# Patient Record
Sex: MALE | Race: WHITE
[De-identification: names, ages, dates, MRNs, and addresses within clinical notes are randomized per-mention and may not be internally consistent; named-entity substitution may affect disease eponyms.]

---

## 2017-08-22 NOTE — CT
CT abdomen and pelvis

 

Technique: Multiple axial sections were obtained from above the dome 

of the diaphragm inferiorly through the pubic symphysis.  Intravenous 

contrast was refused by the patient.  Oral contrast has been given.

 

Findings: Right inguinal hernia is identified containing a portion of 

the sigmoid colon.  This hernia does not appear to cause any 

obstruction at this time.

 

There appears to be bowel wall thickening within the right side of the

 colon and cecum.  No small bowel dilatation is seen.

 

Visualized lung bases shows nothing acute.  Noncontrast appearance of 

the liver appears within normal limits.  Small hiatal hernia is seen. 

 Spleen appears within normal limits.  Kidneys show no abnormal 

calcifications or hydronephrosis.  No discrete adrenal mass is 

appreciated.  Pancreas shows no discrete abnormality.  Surgical clips 

are seen around the stomach.  Appendix not appreciated with certainty.

  No free fluid or definite inflammatory change is seen.

 

Bone window settings were reviewed which show scattered degenerative 

change within the spine.  Unilateral spondylolytic defect is seen at 

L5-S1.

 

Impression:

1.  Right inguinal hernia containing a loop of nondilated sigmoid 

colon.

2.  Bowel wall thickening appears to be present within the right colon

 and cecum compatible with a nonspecific colitis.

3.  Appendix is not definitely visualized.

4.  Small hiatal hernia and other incidental findings.

 

Diagnostic code #3

## 2017-08-22 NOTE — EDM.PDOC
ED HPI GENERAL MEDICAL PROBLEM





- General


Chief Complaint: Abdominal Pain


Stated Complaint: LOWER ABDOMINAL PAIN


Time Seen by Provider: 08/22/17 14:49


Source of Information: Reports: Patient


History Limitations: Reports: No Limitations





- History of Present Illness


INITIAL COMMENTS - FREE TEXT/NARRATIVE: 





The patient presents with lower abdominal pain.  He has a known right inguinal 

hernia that has not bothered him for years.  He was walking across his lawn 

today and developed severe pain to the suprapubic/pubic region.  There was a 

mass there according to his wife.  The pain was better when he got here.  He 

had some nausea with it but no vomiting.  He got diaphoretic.  He has no 

dysuria or hematuria.  He has no fever, chills, cough, chest pain or shortness 

of breath.


Onset: Sudden


Duration: Minutes:


Location: Reports: Abdomen (Suprapubic and pubic)


Quality: Reports: Sharp


Severity: Severe


Improves with: Reports: None


Worsens with: Reports: None


Associated Symptoms: Reports: Nausea/Vomiting.  Denies: Chest Pain, Cough, Fever

/Chills, Shortness of Breath


  ** Lower Abdominal


Pain Score (Numeric/FACES): 8





- Related Data


 Allergies











Allergy/AdvReac Type Severity Reaction Status Date / Time


 


ACE Inhibitors Allergy  Cough Verified 08/22/17 14:50











Home Meds: 


 Home Meds





Acetaminophen/Diphenhydramine [Tylenol Pm Ex-Strength Caplet] 1 tab PO BEDTIME 

PRN 09/09/16 [History]


Aspirin 162 mg PO DAILY 09/09/16 [History]


Fluticasone/Salmeterol [Advair 250-50 Diskus] 1 puff INH BID 09/09/16 [History]


Insulin Detemir [Levemir Flextouch] 6 units SQ BEDTIME 09/09/16 [History]


Insulin Detemir [Levemir Flextouch] 26 units SQ QAM 09/09/16 [History]


Metoprolol Tartrate 25 mg PO BID 09/09/16 [History]


Multivitamin [Multivitamins] 1 each PO DAILY 09/09/16 [History]


Olmesartan [Benicar] 5 mg PO DAILY 09/09/16 [History]


Silver Sulfadiazine [Silvadene 1% Cream 20 GM] 1 applic TOP TID PRN 09/09/16 [

History]


Triamcinolone Acetonide [Kenalog-10] 1 applic TOP BID PRN 09/09/16 [History]


Ubidecarenone [Coq-10] 1 tab PO DAILY 09/09/16 [History]


atorvaSTATin [Lipitor] 40 mg PO BEDTIME 09/09/16 [History]


Levothyroxine 150 mcg PO DAILY 08/22/17 [History]


Melatonin 10 mg PO BEDTIME 08/22/17 [History]











Past Medical History


HEENT History: Reports: Allergic Rhinitis, Impaired Vision, Other (See Below)


Other HEENT History: wears glasses


Cardiovascular History: Reports: CAD, High Cholesterol, Hypertension, MI


Respiratory History: Reports: Sleep Apnea


Other Respiratory History: medialstinal and hilar lympadenopathy, restrictive 

lung disease


Gastrointestinal History: Reports: Other (See Below)


Other Gastrointestinal History: Hernia


Genitourinary History: Reports: Other (See Below)


Other Genitourinary History: erectile dysfunction


Musculoskeletal History: Reports: Fracture


Other Musculoskeletal History: broken wrist


Neurological History: Reports: CVA, Other (See Below)


Other Neuro History: Right sided weakness


Endocrine/Metabolic History: Reports: Diabetes, Type II, Hyperthyroidism, 

Hypothyroidism


Other Endocrine/Metabolic History: thyroid mass


Hematologic History: Reports: Other (See Below)


Other Hematologic History: thrombocytopenia, hyperkalemia


Dermatologic History: Reports: Seborrheic Dermatitis





- Past Surgical History


Cardiovascular Surgical History: Reports: Coronary Artery Bypass


Endocrine Surgical History: Reports: Thyroidectomy





Social & Family History





- Family History


Family Medical History: Noncontributory





- Tobacco Use


Smoking Status *Q: Never Smoker


Month Tobacco Last Used: 30 years ago





- Recreational Drug Use


Recreational Drug Use: No


Drug Use in Last 12 Months: No





ED ROS GENERAL





- Review of Systems


Review Of Systems: See Below


Constitutional: Reports: No Symptoms


HEENT: Reports: No Symptoms


Respiratory: Reports: No Symptoms


Cardiovascular: Reports: No Symptoms


Endocrine: Reports: No Symptoms


GI/Abdominal: Reports: Abdominal Pain, Nausea.  Denies: Vomiting


: Reports: No Symptoms


Musculoskeletal: Reports: No Symptoms





ED EXAM, GI/ABD





- Physical Exam


Exam: See Below


Exam Limited By: No Limitations


General Appearance: Alert, No Apparent Distress


Ears: Normal External Exam


Nose: Normal Inspection


Head: Atraumatic, Normocephalic


Neck: Normal Inspection


Respiratory/Chest: No Respiratory Distress, Lungs Clear, Normal Breath Sounds


Cardiovascular: Regular Rate, Rhythm, No Edema, No Murmur


GI/Abdominal Exam: Soft, Non-Tender, No Organomegaly, No Mass, Other (Fullness 

to the right inguinal area)





Course





- Vital Signs


Last Recorded V/S: 


 Last Vital Signs











Temp  97.2 F   08/22/17 14:46


 


Pulse  69   08/22/17 14:46


 


Resp  16   08/22/17 14:46


 


BP  142/80 H  08/22/17 14:46


 


Pulse Ox  98   08/22/17 14:46














- Orders/Labs/Meds


Orders: 


 Active Orders 24 hr











 Category Date Time Status


 


 Peripheral IV Care [RC] .AS DIRECTED Care  08/22/17 15:08 Active


 


 Sodium Chloride 0.9% [Normal Saline] 1,000 ml Med  08/22/17 15:15 Active





 IV ASDIRECTED   


 


 Sodium Chloride 0.9% [Saline Flush] Med  08/22/17 15:07 Active





 10 ml FLUSH ASDIRECTED PRN   


 


 Peripheral IV Insertion Adult [OM.PC] Stat Oth  08/22/17 15:07 Ordered








 Medication Orders





Sodium Chloride (Normal Saline)  1,000 mls @ 125 mls/hr IV ASDIRECTED ODALIS


   Last Admin: 08/22/17 15:27  Dose: 125 mls/hr


Sodium Chloride (Saline Flush)  10 ml FLUSH ASDIRECTED PRN


   PRN Reason: Keep Vein Open


   Last Admin: 08/22/17 15:26  Dose: 10 ml








Labs: 


 Laboratory Tests











  08/22/17 08/22/17 08/22/17 Range/Units





  15:20 15:25 15:25 


 


WBC   3.43 L   (4.23-9.07)  K/mm3


 


RBC   4.52 L   (4.63-6.08)  M/mm3


 


Hgb   14.6   (13.7-17.5)  gm/L


 


Hct   43.1   (40.1-51.0)  %


 


MCV   95.4 H   (79.0-92.2)  fl


 


MCH   32.3 H   (25.7-32.2)  pg


 


MCHC   33.9   (32.2-35.5)  g/dl


 


RDW Std Deviation   43.7   (35.1-43.9)  fL


 


Plt Count   112 L   (163-337)  K/mm3


 


MPV   11.0   (9.4-12.3)  fl


 


Neut % (Auto)   53.6   (34.0-67.9)  %


 


Lymph % (Auto)   28.3   (21.8-53.1)  %


 


Mono % (Auto)   10.8   (5.3-12.2)  %


 


Eos % (Auto)   6.4   (0.8-7.0)  


 


Baso % (Auto)   0.9   (0.1-1.2)  %


 


Neut # (Auto)   1.84   (1.78-5.38)  K/mm3


 


Lymph # (Auto)   0.97 L   (1.32-3.57)  K/mm3


 


Mono # (Auto)   0.37   (0.30-0.82)  K/mm3


 


Eos # (Auto)   0.22   (0.04-0.54)  K/mm3


 


Baso # (Auto)   0.03   (0.01-0.08)  K/mm3


 


Sodium    142  (136-145)  mEq/L


 


Potassium    5.1  (3.5-5.1)  mEq/L


 


Chloride    105  ()  mEq/L


 


Carbon Dioxide    29  (21-32)  mEq/L


 


Anion Gap    13.1  (5-15)  


 


BUN    26 H  (7-18)  mg/dL


 


Creatinine    1.3  (0.7-1.3)  mg/dL


 


Est Cr Clr Drug Dosing    55.45  mL/min


 


Estimated GFR (MDRD)    55  (>60)  mL/min


 


BUN/Creatinine Ratio    20.0 H  (14-18)  


 


Glucose    280 H  ()  mg/dL


 


Calcium    8.5  (8.5-10.1)  mg/dL


 


Total Bilirubin    0.8  (0.2-1.0)  mg/dL


 


AST    39 H  (15-37)  U/L


 


ALT    46  (16-63)  U/L


 


Alkaline Phosphatase    91  ()  U/L


 


Total Protein    6.6  (6.4-8.2)  g/dl


 


Albumin    3.5  (3.4-5.0)  g/dl


 


Globulin    3.1  gm/dL


 


Albumin/Globulin Ratio    1.1  (1-2)  


 


Lipase    111  ()  U/L


 


Urine Color  Light yellow    (Yellow)  


 


Urine Appearance  Clear    (Clear)  


 


Urine pH  6.5    (5.0-8.0)  


 


Ur Specific Gravity  1.015    (1.005-1.030)  


 


Urine Protein  Negative    (Negative)  


 


Urine Glucose (UA)  2+ H    (Negative)  


 


Urine Ketones  Negative    (Negative)  


 


Urine Occult Blood  Trace-intact H    (Negative)  


 


Urine Nitrite  Negative    (Negative)  


 


Urine Bilirubin  Negative    (Negative)  


 


Urine Urobilinogen  0.2    (0.2-1.0)  


 


Ur Leukocyte Esterase  Negative    (Negative)  


 


Urine RBC  5-10 H    (0-5)  /hpf


 


Urine WBC  0-5    (0-5)  /hpf


 


Ur Epithelial Cells  0-5    (0-5)  /hpf


 


Urine Bacteria  Rare    (FEW)  /hpf


 


Urine Mucus  Not seen    (FEW)  /hpf











Meds: 


Medications











Generic Name Dose Route Start Last Admin





  Trade Name Freq  PRN Reason Stop Dose Admin


 


Sodium Chloride  1,000 mls @ 125 mls/hr  08/22/17 15:15  08/22/17 15:27





  Normal Saline  IV   125 mls/hr





  ASDIRECTED ODALIS   Administration


 


Sodium Chloride  10 ml  08/22/17 15:07  08/22/17 15:26





  Saline Flush  FLUSH   10 ml





  ASDIRECTED PRN   Administration





  Keep Vein Open   














Discontinued Medications














Generic Name Dose Route Start Last Admin





  Trade Name Freq  PRN Reason Stop Dose Admin


 


Diatrizoate Meglum/Diatrizoate Sod  90 ml  08/22/17 15:55  08/22/17 16:26





  Gastrografin 37%  PO  08/22/17 15:56  90 ml





  ONETIME ONE   Administration


 


Hydromorphone HCl  0.5 mg  08/22/17 16:55  





  Dilaudid  IVPUSH  08/22/17 16:56  





  ONETIME ONE   


 


Lorazepam  0.5 mg  08/22/17 16:55  





  Ativan  IVPUSH  08/22/17 16:56  





  ONETIME ONE   














- Re-Assessments/Exams


Free Text/Narrative Re-Assessment/Exam: 





08/22/17 17:23


I ordered an IV saline lock, labs and a CT of his abdomen and pelvis.


08/22/17 17:26


His WBC was 3.43.  His BUN was elevated at 26.  His glucose was 280.  His AST 

was elevated at 39.  His UA shows no UTI.  His CT shows right inguinal hernia 

containing a loop of nondilated sigmoid colon.  Bowel wall thickening appears 

to be present within the right colon and cecum compatible with a nonspecific 

colitis.  Appendix is not definitely visualized.  Small hiatal hernia and other 

incidental findings.  When he go up to go to CT his pain come back but not as 

bad.  He went to have a bowel movement and it came back severe.  I ordered 

dilaudid 0.5mg IV and ativan 0.5mg IV.  He was able to relax and the pain got 

better.  The hernia was not bulging out when he had the severe pain.  I could 

not fully reduce it.


08/22/17 17:40


Dr Easley came to see the patient and he was able to fully reduce it.  He 

would like to see him Thursday and he would like a truss from [facility].





Departure





- Departure


Time of Disposition: 17:45


Disposition: Home, Self-Care 01


Condition: Good


Clinical Impression: 


 Right inguinal hernia








- Discharge Information


Referrals: 


Chris Easley MD [Physician] - 


Forms:  ED Department Discharge


Additional Instructions: 


Get the hernia truss from [facility] formally know as [facility].  Follow 

up with  Thursday.  Call tomorrow to make an appointment and make sure you 

tell them you were in the ER and Dr Easley wants to see you on Thursday.  

Please return if you are worse.





- My Orders


Last 24 Hours: 


My Active Orders





08/22/17 15:07


Sodium Chloride 0.9% [Saline Flush]   10 ml FLUSH ASDIRECTED PRN 


Peripheral IV Insertion Adult [OM.PC] Stat 





08/22/17 15:08


Peripheral IV Care [RC] .AS DIRECTED 





08/22/17 15:15


Sodium Chloride 0.9% [Normal Saline] 1,000 ml IV ASDIRECTED 














- Assessment/Plan


Last 24 Hours: 


My Active Orders





08/22/17 15:07


Sodium Chloride 0.9% [Saline Flush]   10 ml FLUSH ASDIRECTED PRN 


Peripheral IV Insertion Adult [OM.PC] Stat 





08/22/17 15:08


Peripheral IV Care [RC] .AS DIRECTED 





08/22/17 15:15


Sodium Chloride 0.9% [Normal Saline] 1,000 ml IV ASDIRECTED

## 2017-09-20 NOTE — PCM.POSTAN
POST ANESTHESIA ASSESSMENT





- MENTAL STATUS


Mental Status: Alert, Oriented





- VITAL SIGNS


Pulse Rate: 76


SaO2: 94


Resp Rate: 16


Blood Pressure: 106/64


Temperature: 37.6 C





- RESPIRATORY


Respiratory Status: Respiratory Rate WNL, Airway Patent, O2 Saturation Stable, 

Supplemental Oxygen





- CARDIOVASCULAR


CV Status: Pulse Rate WNL, Blood Pressure Stable





- GASTROINTESTINAL


GI Status: No Symptoms





- PAIN


Pain Score: 0





- POST OP HYDRATION


Hydration Status: Adequate & Stable

## 2017-09-20 NOTE — PCM.OPNOTE
- General Post-Op/Procedure Note


Date of Surgery/Procedure: 09/20/17


Operative Procedure(s): 1. Open right inguinal hernia repair with mesh.  2. 

Excision of cord lipoma


Findings: 





Subcutaneous tissue scarring as well as scarring along the external oblique 

fascia. There was a large indirect hernia sac located anterior and medial to 

the spermatic cord structures. The floor of the canal was in good shape. There 

was a large cord lipoma attached to the hernia sac in the lateral position.


Pre Op Diagnosis: Symptomatic right inguinal hernia


Post-Op Diagnosis: Indirect right inguinal hernia.  Cord lipoma


Anesthesia Technique: General LMA, Local


Primary Surgeon: Chris Easley


Pathology: 





None


EBL in mLs: 5


Complications: None


Condition: Good


Free Text/Narrative:: 





After adequate LMA general anesthesia was obtained the patient's right groin 

was prepped and draped sterilely for the procedure. Local analgesia was given 

over the inguinal canal. An incision was made with a knife and extended down to 

the external oblique fascia which was opened in the direction of its fibers. 

There was extensive scarring from the truss that he had been wearing and the 

chronicity hernia. The nerve was identified and retracted laterally. The cord 

was mobilized at the pubic tubercle. The floor of the canal was intact. There 

was a large hernia sac present along the anteromedial aspect of the spermatic 

cord. The sac was carefully dissected away from cord structures after 

mobilizing the cord and controlled with a Penrose drain. I opened the sac which 

was empty. I used a 2-0 silk suture to ligate the base of the sac which was 

amputated. The previously described cord lipoma was clamped at its base and 

ligated as well after excision. Mesh was placed in the floor of the canal 

securing it to the inguinal ligament and the internal oblique fascia with 

running 2-0 Prolene. A keyhole in the mesh was used for cord egress. The field 

was irrigated out with saline. Spot hemostasis was obtained with cautery. The 

external oblique fascia was closed over the cord with a running Vicryl. Danica'

s and the skin were closed with Vicryl as well. Additional local analgesia was 

given before skin closure. Staples were used to close the skin. Gauze and tape 

were used for the dressing. There were no procedural complications.

## 2017-09-20 NOTE — PCM.PREANE
Preanesthetic Assessment





- Anesthesia/Transfusion/Family Hx


Anesthesia History: Prior Anesthesia Without Reaction


Family History of Anesthesia Reaction: No


Transfusion History: No Prior Transfusion(s)


Intubation History: Unknown





- Review of Systems


General: No Symptoms


Pulmonary: No Symptoms (Quit smoking 30 years ago./COPD Restrictive in pattern/

Resolved TSERING with weight loss)


Cardiovascular: No Symptoms (HTN, CAD, MI tentatively in 2014, CABG in 1998 

times 5 vessels, Patient denies any chest pain currently and walks daily.)


Neurological: No Symptoms (History of CVA 2014), Weakness (Right sided weakness 

noted.), Gait Disturbance (due to stroke and right sided weakness)


Other: Reports: Diabetes (0630 UT=893), Thyroid Problems (Thyroidectomy in July 2017)





- Physical Assessment


NPO Status Date: 09/19/17


NPO Status Time: 18:00


Pulse: 58


O2 Sat by Pulse Oximetry: 98


Respiratory Rate: 16


Blood Pressure: 130/78


Temperature: 36.6 C


Height: 1.79 m


Weight: 94.801 kg


ASA Class: 3


Mental Status: Alert & Oriented x3


Airway Class: Mallampati = 2


Dentition: Reports: Normal Dentition, Caries


Thyro-Mental Finger Breadths: 3


Mouth Opening Finger Breadths: 3


ROM/Head Extension: Full


Lungs: Clear to Auscultation, Normal Respiratory Effort, Decreased Breath Sounds


Cardiovascular: Regular Rate, Regular Rhythm, No Murmurs





- Lab


Values: 





Lab values reviewed and noted and within acceptable ranges to proceed with 

scheduled procedure.





Elevated BUN =26





MRSA -





- Imaging/EKG


Impressions: 





EKG:(July 13, 2017) SB with PVC's rate=59, RBBB, Inferior infarct- age 

undetermined





Stable mild hilar mediastinal lymphadenopathy noted.





EF: 55%





- Allergies


Allergies/Adverse Reactions: 


 Allergies











Allergy/AdvReac Type Severity Reaction Status Date / Time


 


ACE Inhibitors AdvReac  Cough Verified 09/20/17 07:54














- Anesthesia Plan


Pre-Op Medication Ordered: None


Beta Blocker: Metoprolol


Med Last Dose Date: 09/20/17


Med Last Dose Time: 06:30





- Acknowledgements


Anesthesia Type Planned: General Anesthesia


Pt an Appropriate Candidate for the Planned Anesthesia: Yes


Alternatives and Risks of Anesthesia Discussed w Pt/Guardian: Yes


Pt/Guardian Understands and Agrees with Anesthesia Plan: Yes





PreAnesthesia Questionnaire


HEENT History: Reports: Allergic Rhinitis, Impaired Vision, Other (See Below)


Other HEENT History: wears glasses


Cardiovascular History: Reports: CAD, High Cholesterol, Hypertension, MI


Respiratory History: Reports: Sleep Apnea


Other Respiratory History: medialstinal and hilar lympadenopathy, restrictive 

lung disease


Gastrointestinal History: Reports: Colon Polyp, Other (See Below)


Other Gastrointestinal History: Hernia


Genitourinary History: Reports: Other (See Below)


Other Genitourinary History: erectile dysfunction


OB/GYN History: Reports: None


Musculoskeletal History: Reports: Fracture


Other Musculoskeletal History: broken wrist


Neurological History: Reports: CVA, Other (See Below)


Other Neuro History: Right sided weakness


Psychiatric History: Reports: None


Endocrine/Metabolic History: Reports: Diabetes, Type II, Hyperthyroidism, 

Hypothyroidism


Other Endocrine/Metabolic History: thyroid mass


Hematologic History: Reports: Other (See Below)


Other Hematologic History: thrombocytopenia, hyperkalemia


Immunologic History: Reports: None


Oncologic (Cancer) History: Reports: None


Dermatologic History: Reports: Seborrheic Dermatitis





- Past Surgical History


Head Surgeries/Procedures: Reports: None


Cardiovascular Surgical History: Reports: Coronary Artery Bypass


GI Surgical History: Reports: Colonoscopy


Endocrine Surgical History: Reports: Thyroidectomy





- SUBSTANCE USE


Smoking Status *Q: Never Smoker


Recreational Drug Use History: No





- HOME MEDS


Home Medications: 


 Home Meds





Acetaminophen/Diphenhydramine [Tylenol Pm Ex-Strength Caplet] 1 tab PO BEDTIME 

PRN 09/09/16 [History]


Aspirin 162 mg PO DAILY 09/09/16 [History]


Fluticasone/Salmeterol [Advair 250-50 Diskus] 1 puff INH BID 09/09/16 [History]


Insulin Detemir [Levemir Flextouch] 6 units SQ BEDTIME 09/09/16 [History]


Insulin Detemir [Levemir Flextouch] 26 units SQ QAM 09/09/16 [History]


Metoprolol Tartrate 25 mg PO BID 09/09/16 [History]


Multivitamin [Multivitamins] 1 each PO DAILY 09/09/16 [History]


Olmesartan [Benicar] 5 mg PO DAILY 09/09/16 [History]


Silver Sulfadiazine [Silvadene 1% Cream 20 GM] 1 applic TOP TID PRN 09/09/16 [

History]


Triamcinolone Acetonide [Kenalog-10] 1 applic TOP BID PRN 09/09/16 [History]


Ubidecarenone [Coq-10] 1 tab PO DAILY 09/09/16 [History]


atorvaSTATin [Lipitor] 40 mg PO BEDTIME 09/09/16 [History]


Levothyroxine 150 mcg PO DAILY 08/22/17 [History]


Melatonin 10 mg PO BEDTIME 08/22/17 [History]











- CURRENT (IN HOUSE) MEDS


Current Meds: 





 Current Medications





Lactated Ringer's (Ringers, Lactated)  1,000 mls @ 125 mls/hr IV ASDIRECTED ODALIS


   Stop: 09/20/17 23:00


Lidocaine/Sodium Bicarbonate (Buffered Lidocaine 1% In Ns 8.4%)  0.25 ml .XX 

ONETIME PRN


   PRN Reason: Prior to IV Start


   Stop: 09/20/17 18:00


Sodium Chloride (Saline Flush)  10 ml FLUSH ASDIRECTED PRN


   PRN Reason: Keep Vein Open


   Stop: 09/20/17 18:00





Discontinued Medications





Dexamethasone (Dexamethasone) Confirm Administered Dose 4 mg .ROUTE .STK-MED ONE


   Stop: 09/20/17 07:12


Fentanyl (Sublimaze) Confirm Administered Dose 250 mcg .ROUTE .STK-MED ONE


   Stop: 09/20/17 07:11


Lidocaine HCl (Xylocaine-Mpf 1%) Confirm Administered Dose 4 mls @ as directed 

.ROUTE .STK-MED ONE


   Stop: 09/20/17 07:12


Midazolam HCl (Versed 1 Mg/Ml) Confirm Administered Dose 2 mg .ROUTE .STK-MED 

ONE


   Stop: 09/20/17 07:11


Ondansetron HCl (Zofran) Confirm Administered Dose 4 mg .ROUTE .STK-MED ONE


   Stop: 09/20/17 07:12


Propofol (Diprivan  20 Ml) Confirm Administered Dose 200 mg .ROUTE .STK-MED ONE


   Stop: 09/20/17 07:11

## 2017-11-13 NOTE — EDM.PDOC
ED HPI GENERAL MEDICAL PROBLEM





- General


Chief Complaint: Lower Extremity Injury/Pain


Stated Complaint: [facility]


Time Seen by Provider: 11/13/17 18:32


Source of Information: Reports: Patient


History Limitations: Reports: No Limitations





- History of Present Illness


INITIAL COMMENTS - FREE TEXT/NARRATIVE: 





Patient is a 72-year-old male with a history of stroke in 2014 with balance 

issues who presents to the ED complaining of right hip pain. Patient states he 

was putting something away in the kitchen and turned. Upon turning the patient 

lost his balance and fell on his right hip. There was no loss consciousness. He 

only complains of right hip pain denies any additional injuries remaining parts 

of his body.   Nor does he complain of any chest pain, shortness of breath, 

nausea vomiting, abdominal pain, numbness or tingling, or any additional 

complaints. Last meal was approximately noon today. States he has not had 

anything to drink sine lunch as well.  Patient has a long history of chronic 

comorbidities. Please see list. In relation to the stroke they were unable to 

isolate where the emboli came from. They suspect patient may have had a heart 

attack while crossing the [place] throwing multiple clots to his brain. Patient 

has has had multiple strokes apparent on imaging.


  ** Right Hip


Pain Score (Numeric/FACES): 8





- Related Data


 Allergies











Allergy/AdvReac Type Severity Reaction Status Date / Time


 


ACE Inhibitors AdvReac  Cough Verified 09/20/17 07:54











Home Meds: 


 Home Meds





Acetaminophen/Diphenhydramine [Tylenol Pm Ex-Strength Caplet] 1 tab PO BEDTIME 

PRN 09/09/16 [History]


Aspirin 162 mg PO DAILY 09/09/16 [History]


Fluticasone/Salmeterol [Advair 250-50 Diskus] 1 puff INH BID 09/09/16 [History]


Insulin Detemir [Levemir Flextouch] 6 units SQ BEDTIME 09/09/16 [History]


Insulin Detemir [Levemir Flextouch] 26 units SQ QAM 09/09/16 [History]


Metoprolol Tartrate 25 mg PO BID 09/09/16 [History]


Multivitamin [Multivitamins] 1 each PO DAILY 09/09/16 [History]


Olmesartan [Benicar] 5 mg PO DAILY 09/09/16 [History]


Silver Sulfadiazine [Silvadene 1% Cream 20 GM] 1 applic TOP TID PRN 09/09/16 [

History]


Triamcinolone Acetonide [Kenalog-10] 1 applic TOP BID PRN 09/09/16 [History]


Ubidecarenone [Coq-10] 1 tab PO DAILY 09/09/16 [History]


atorvaSTATin [Lipitor] 40 mg PO BEDTIME 09/09/16 [History]


Levothyroxine 150 mcg PO DAILY 08/22/17 [History]


Melatonin 10 mg PO BEDTIME 08/22/17 [History]











Past Medical History


HEENT History: Reports: Allergic Rhinitis, Impaired Vision, Other (See Below)


Other HEENT History: wears glasses


Cardiovascular History: Reports: CAD, High Cholesterol, Hypertension, MI


Respiratory History: Reports: Sleep Apnea


Other Respiratory History: medialstinal and hilar lympadenopathy, restrictive 

lung disease


Gastrointestinal History: Reports: Colon Polyp, Other (See Below)


Other Gastrointestinal History: Hernia


Genitourinary History: Reports: Other (See Below)


Other Genitourinary History: erectile dysfunction


OB/GYN History: Reports: None


Musculoskeletal History: Reports: Fracture


Other Musculoskeletal History: broken wrist


Neurological History: Reports: CVA, Other (See Below)


Other Neuro History: Right sided weakness


Psychiatric History: Reports: None


Endocrine/Metabolic History: Reports: Diabetes, Type II, Hyperthyroidism, 

Hypothyroidism


Other Endocrine/Metabolic History: thyroid mass


Hematologic History: Reports: Other (See Below)


Other Hematologic History: thrombocytopenia, hyperkalemia


Immunologic History: Reports: None


Oncologic (Cancer) History: Reports: None


Dermatologic History: Reports: Seborrheic Dermatitis





- Past Surgical History


Head Surgeries/Procedures: Reports: None


Cardiovascular Surgical History: Reports: Coronary Artery Bypass


GI Surgical History: Reports: Colonoscopy


Endocrine Surgical History: Reports: Thyroidectomy





Social & Family History





- Family History


Family Medical History: Noncontributory





- Tobacco Use


Smoking Status *Q: Never Smoker


Month Tobacco Last Used: 30 years ago


Second Hand Smoke Exposure: No





- Caffeine Use


Caffeine Use: Reports: Coffee





- Recreational Drug Use


Recreational Drug Use: No


Drug Use in Last 12 Months: No





Review of Systems





- Review of Systems


Review Of Systems: ROS reveals no pertinent complaints other than HPI.





ED EXAM, GENERAL





- Physical Exam


Exam: See Below


Exam Limited By: No Limitations


General Appearance: Alert, WD/WN, No Apparent Distress


Ears: Hearing Grossly Normal


Nose: No Blood


Throat/Mouth: Normal Voice, No Airway Compromise


Neck: Normal Inspection, Supple


Respiratory/Chest: No Respiratory Distress, Lungs Clear, Normal Breath Sounds, 

No Accessory Muscle Use, Chest Non-Tender


Cardiovascular: Normal Peripheral Pulses, Regular Rate, Rhythm, No Murmur (not 

obvious)


GI/Abdominal: Normal Bowel Sounds, Soft, Non-Tender, No Organomegaly, No 

Distention


Back Exam: Normal Inspection


Extremities: Other (Pain noted to the right hip with palpation. Patient's leg 

is externally rotated and shortened. Trace edema noted. No sensory motor 

deficits distally. Pulses intact.)


Neurological: Alert, CN II-XII Intact, Normal Cognition, No Motor/Sensory 

Deficits


Psychiatric: Normal Affect, Normal Mood


Skin Exam: Warm, Dry, Intact, Normal Color





Course





- Vital Signs


Last Recorded V/S: 


 Last Vital Signs











Temp  99.3 F   11/13/17 20:26


 


Pulse  72   11/13/17 20:26


 


Resp  17   11/13/17 20:26


 


BP  114/65   11/13/17 20:26


 


Pulse Ox  99   11/13/17 20:26














- Orders/Labs/Meds


Orders: 


 Active Orders 24 hr











 Category Date Time Status


 


 Mitchell Catheter Insertion [Insert Urinary Catheter] [OM. Care  11/13/17 18:45 

Ordered





 PC] Stat   


 


 Urinary Catheter Assessment [RC] ASDIRECTED Care  11/13/17 18:34 Active


 


 Chest 1V Frontal [CR] Stat Exams  11/13/17 18:33 Taken


 


 Hip Min 2V or 3V w Pelvis Rt [CR] Stat Exams  11/13/17 17:43 Taken


 


 Sodium Chloride 0.9% [Normal Saline] 1,000 ml Med  11/13/17 18:45 Active





 IV ASDIRECTED   








 Medication Orders





Acetaminophen (Tylenol)  650 mg PO Q4H PRN


   PRN Reason: Pain (Mild 1-3)/fever


Hydrocodone Bitart/Acetaminophen (Norco 325-5 Mg)  1 tab PO Q4H PRN


   PRN Reason: Pain (moderate 4-6)


   Last Admin: 11/13/17 22:13  Dose: 1 tab


Albuterol/Ipratropium (Duoneb 3.0-0.5 Mg/3 Ml)  3 ml NEB Q4H PRN


   PRN Reason: Shortness Of Breath/wheezing


Aspirin (Halfprin)  162 mg PO DAILY ODALIS


Bisacodyl (Dulcolax)  5 mg PO DAILY PRN


   PRN Reason: Constipation


Docusate Sodium (Colace)  100 mg PO BID PRN


   PRN Reason: Constipation


Hydromorphone HCl (Dilaudid)  1 mg IVPUSH Q4H PRN


   PRN Reason: Pain (severe 7-10)


Sodium Chloride (Normal Saline)  1,000 mls @ 150 mls/hr IV ASDIRECTED [facility]


   Last Admin: 11/13/17 19:32  Dose: 150 mls/hr


Promethazine HCl 12.5 mg/ (Sodium Chloride)  50.5 mls @ 100 mls/hr IV Q6H PRN


   PRN Reason: Nausea/Vomiting


Insulin Detemir (Levemir)  6 unit SUBCUT BEDTIME [facility]


Insulin Detemir (Levemir)  26 unit SUBCUT QAM [facility]


Levothyroxine Sodium (Levothyroxine)  150 mcg PO DAILY@0600 [facility]


Lorazepam (Ativan)  0.5 mg IV Q6H PRN


   PRN Reason: Anxiety


Losartan Potassium (Cozaar)  25 mg PO DAILY [facility]


Metoprolol Tartrate (Lopressor)  25 mg PO BID [facility]


Multivitamins (Thera)  1 each PO DAILY [facility]


Non-Formulary Medication (Acetaminophen/Diphenhydramine)  1 tab PO BEDTIME PRN


   PRN Reason: Insomnia


Non-Formulary Medication (Fluticasone/Salmeterol)  1 puff INH BID [facility]


Non-Formulary Medication (Melatonin [Melatonin])  10 mg PO BEDTIME [facility]


Non-Formulary Medication (Triamcinolone Acetonide [Kenalog-10])  1 applic TOP 

BID PRN


   PRN Reason: skin complications


Non-Formulary Medication (Ubidecarenone)  1 tab PO DAILY [facility]


Ondansetron HCl (Zofran)  4 mg IV Q6H PRN


   PRN Reason: Nausea/Vomiting


Polyethylene Glycol (Miralax)  17 gm PO DAILY PRN


   PRN Reason: Constipation


Rosuvastatin Calcium (Crestor)  10 mg PO DAILY [facility]


Senna/Docusate Sodium (Senna Plus)  1 tab PO BID PRN


   PRN Reason: Constipation


Silver Sulfadiazine (Silvadene 1% Cream 50 Gm)  0 gm TOP TID PRN


   PRN Reason: SKIN COMPLICATIONS


Temazepam (Restoril)  15 mg PO BEDTIME PRN


   PRN Reason: Sleep








Labs: 


 Laboratory Tests











  11/13/17 11/13/17 11/13/17 Range/Units





  17:50 17:50 17:50 


 


WBC  5.41    (4.23-9.07)  K/mm3


 


RBC  4.47 L    (4.63-6.08)  M/mm3


 


Hgb  14.4    (13.7-17.5)  gm/L


 


Hct  42.2    (40.1-51.0)  %


 


MCV  94.4 H    (79.0-92.2)  fl


 


MCH  32.2    (25.7-32.2)  pg


 


MCHC  34.1    (32.2-35.5)  g/dl


 


RDW Std Deviation  42.6    (35.1-43.9)  fL


 


Plt Count  130 L    (163-337)  K/mm3


 


MPV  10.6    (9.4-12.3)  fl


 


Neut % (Auto)  49.2    (34.0-67.9)  %


 


Lymph % (Auto)  29.6    (21.8-53.1)  %


 


Mono % (Auto)  12.6 H    (5.3-12.2)  %


 


Eos % (Auto)  7.4 H    (0.8-7.0)  


 


Baso % (Auto)  0.6    (0.1-1.2)  %


 


Neut # (Auto)  2.67    (1.78-5.38)  K/mm3


 


Lymph # (Auto)  1.60    (1.32-3.57)  K/mm3


 


Mono # (Auto)  0.68    (0.30-0.82)  K/mm3


 


Eos # (Auto)  0.40    (0.04-0.54)  K/mm3


 


Baso # (Auto)  0.03    (0.01-0.08)  K/mm3


 


PT   10.9   (8.0-13.0)  SECONDS


 


INR   1.00   


 


APTT   27   (22-36)  SECONDS


 


Sodium    141  (136-145)  mEq/L


 


Potassium    4.1  (3.5-5.1)  mEq/L


 


Chloride    105  ()  mEq/L


 


Carbon Dioxide    26  (21-32)  mEq/L


 


Anion Gap    14.1  (5-15)  


 


BUN    22 H  (7-18)  mg/dL


 


Creatinine    1.2  (0.7-1.3)  mg/dL


 


Est Cr Clr Drug Dosing    61.01  mL/min


 


Estimated GFR (MDRD)    60  (>60)  mL/min


 


BUN/Creatinine Ratio    18.3 H  (14-18)  


 


Glucose    156 H  ()  mg/dL


 


Calcium    8.3 L  (8.5-10.1)  mg/dL


 


Total Bilirubin    0.6  (0.2-1.0)  mg/dL


 


AST    23  (15-37)  U/L


 


ALT    35  (16-63)  U/L


 


Alkaline Phosphatase    99  ()  U/L


 


Total Protein    6.4  (6.4-8.2)  g/dl


 


Albumin    3.4  (3.4-5.0)  g/dl


 


Globulin    3.0  gm/dL


 


Albumin/Globulin Ratio    1.1  (1-2)  


 


Blood Type     


 


Gel Antibody Screen     














  11/13/17 Range/Units





  17:50 


 


WBC   (4.23-9.07)  K/mm3


 


RBC   (4.63-6.08)  M/mm3


 


Hgb   (13.7-17.5)  gm/L


 


Hct   (40.1-51.0)  %


 


MCV   (79.0-92.2)  fl


 


MCH   (25.7-32.2)  pg


 


MCHC   (32.2-35.5)  g/dl


 


RDW Std Deviation   (35.1-43.9)  fL


 


Plt Count   (163-337)  K/mm3


 


MPV   (9.4-12.3)  fl


 


Neut % (Auto)   (34.0-67.9)  %


 


Lymph % (Auto)   (21.8-53.1)  %


 


Mono % (Auto)   (5.3-12.2)  %


 


Eos % (Auto)   (0.8-7.0)  


 


Baso % (Auto)   (0.1-1.2)  %


 


Neut # (Auto)   (1.78-5.38)  K/mm3


 


Lymph # (Auto)   (1.32-3.57)  K/mm3


 


Mono # (Auto)   (0.30-0.82)  K/mm3


 


Eos # (Auto)   (0.04-0.54)  K/mm3


 


Baso # (Auto)   (0.01-0.08)  K/mm3


 


PT   (8.0-13.0)  SECONDS


 


INR   


 


APTT   (22-36)  SECONDS


 


Sodium   (136-145)  mEq/L


 


Potassium   (3.5-5.1)  mEq/L


 


Chloride   ()  mEq/L


 


Carbon Dioxide   (21-32)  mEq/L


 


Anion Gap   (5-15)  


 


BUN   (7-18)  mg/dL


 


Creatinine   (0.7-1.3)  mg/dL


 


Est Cr Clr Drug Dosing   mL/min


 


Estimated GFR (MDRD)   (>60)  mL/min


 


BUN/Creatinine Ratio   (14-18)  


 


Glucose   ()  mg/dL


 


Calcium   (8.5-10.1)  mg/dL


 


Total Bilirubin   (0.2-1.0)  mg/dL


 


AST   (15-37)  U/L


 


ALT   (16-63)  U/L


 


Alkaline Phosphatase   ()  U/L


 


Total Protein   (6.4-8.2)  g/dl


 


Albumin   (3.4-5.0)  g/dl


 


Globulin   gm/dL


 


Albumin/Globulin Ratio   (1-2)  


 


Blood Type  O POSITIVE  


 


Gel Antibody Screen  Negative  











Meds: 


Medications











Generic Name Dose Route Start Last Admin





  Trade Name Freq  PRN Reason Stop Dose Admin


 


Acetaminophen  650 mg  11/13/17 21:30  





  Tylenol  PO   





  Q4H PRN   





  Pain (Mild 1-3)/fever   


 


Hydrocodone Bitart/Acetaminophen  1 tab  11/13/17 21:30  11/13/17 22:13





  Norco 325-5 Mg  PO   1 tab





  Q4H PRN   Administration





  Pain (moderate 4-6)   


 


Albuterol/Ipratropium  3 ml  11/13/17 21:30  





  Duoneb 3.0-0.5 Mg/3 Ml  NEB   





  Q4H PRN   





  Shortness Of Breath/wheezing   


 


Aspirin  162 mg  11/14/17 09:00  





  Halfprin  PO   





  DAILY ODALIS   


 


Bisacodyl  5 mg  11/13/17 21:30  





  Dulcolax  PO   





  DAILY PRN   





  Constipation   


 


Docusate Sodium  100 mg  11/13/17 21:30  





  Colace  PO   





  BID PRN   





  Constipation   


 


Hydromorphone HCl  1 mg  11/13/17 21:30  





  Dilaudid  IVPUSH   





  Q4H PRN   





  Pain (severe 7-10)   


 


Sodium Chloride  1,000 mls @ 150 mls/hr  11/13/17 18:45  11/13/17 19:32





  Normal Saline  IV   150 mls/hr





  ASDIRECTED ODALIS   Administration


 


Promethazine HCl 12.5 mg/  50.5 mls @ 100 mls/hr  11/13/17 21:30  





  Sodium Chloride  IV   





  Q6H PRN   





  Nausea/Vomiting   


 


Insulin Detemir  6 unit  11/14/17 21:00  





  Levemir  SUBCUT   





  BEDTIME ODALIS   


 


Insulin Detemir  26 unit  11/14/17 08:00  





  Levemir  SUBCUT   





  QAM [facility]   


 


Levothyroxine Sodium  150 mcg  11/14/17 06:00  





  Levothyroxine  PO   





  DAILY@0600 ODAL[facility]   


 


Lorazepam  0.5 mg  11/13/17 21:30  





  Ativan  IV   





  Q6H PRN   





  Anxiety   


 


Losartan Potassium  25 mg  11/14/17 09:00  





  Cozaar  PO   





  DAILY ODALIS   


 


Metoprolol Tartrate  25 mg  11/14/17 09:00  





  Lopressor  PO   





  BID ODALIS   


 


Multivitamins  1 each  11/14/17 09:00  





  Thera  PO   





  DAILY ODAL[facility]   


 


Non-Formulary Medication  1 tab  11/13/17 21:29  





  Acetaminophen/Diphenhydramine  PO   





  BEDTIME PRN   





  Insomnia   


 


Non-Formulary Medication  1 puff  11/14/17 09:00  





  Fluticasone/Salmeterol  INH   





  BID ODALIS   


 


Non-Formulary Medication  10 mg  11/14/17 21:00  





  Melatonin [Melatonin]  PO   





  BEDTIME ODAL[facility]   


 


Non-Formulary Medication  1 applic  11/13/17 21:29  





  Triamcinolone Acetonide [Kenalog-10]  TOP   





  BID PRN   





  skin complications   


 


Non-Formulary Medication  1 tab  11/14/17 09:00  





  Ubidecarenone  PO   





  DAILY ODALIS   


 


Ondansetron HCl  4 mg  11/13/17 21:30  





  Zofran  IV   





  Q6H PRN   





  Nausea/Vomiting   


 


Polyethylene Glycol  17 gm  11/13/17 21:30  





  Miralax  PO   





  DAILY PRN   





  Constipation   


 


Rosuvastatin Calcium  10 mg  11/14/17 09:00  





  Crestor  PO   





  DAILY ODALIS   


 


Senna/Docusate Sodium  1 tab  11/13/17 21:30  





  Senna Plus  PO   





  BID PRN   





  Constipation   


 


Silver Sulfadiazine  0 gm  11/13/17 22:15  





  Silvadene 1% Cream 50 Gm  TOP   





  TID PRN   





  SKIN COMPLICATIONS   


 


Temazepam  15 mg  11/13/17 21:30  





  Restoril  PO   





  BEDTIME PRN   





  Sleep   














Discontinued Medications














Generic Name Dose Route Start Last Admin





  Trade Name Freq  PRN Reason Stop Dose Admin


 


Hydromorphone HCl  0.5 mg  11/13/17 17:58  11/13/17 18:01





  Dilaudid  IVPUSH  11/13/17 17:59  0.5 mg





  NOW STA   Administration


 


Hydromorphone HCl  1 mg  11/13/17 18:31  11/13/17 18:36





  Dilaudid  IVPUSH  11/13/17 18:32  1 mg





  ONETIME ONE   Administration


 


Silver Sulfadiazine  0 gm  11/13/17 22:15  





  Silvadene 1% Cream 400 Gm  TOP   





  TID PRN   





  SKIN COMPLICATIONS   














- Re-Assessments/Exams


Free Text/Narrative Re-Assessment/Exam: 








X-ray of the right hip was obtained revealing a intertrochanteric fracture 

fracture. IV has been established. With Dilaudid 0.5 mg IVP. 





Patient is requesting additional pain medications. Ordered normal saline 150 

mls per hour with Dilaudid 1 mg IVP. Labs and studies ordered include: CBC, 

chem 14, UA, type and screen, EKG, chest x-ray ordered. In addition Mitchell has 

been ordered.





1928 Spoke with Dr. Berry Orthopedic Surgeon on-call. He requests admit to 

hospitalists to medically clear for surgery, plan on doing surgery Wednesday of 

this week.





MCG being completed. 





Labs reviewed: CBC and chem 14 were essentially normal.





Chest x-ray one view: Old sternotomy wires from previous coronary bypass. 

Otherwise no acute findings noted. Final interpretation is pending.





EKG sinus rhythm with a right bundle branch block at a rate of 76. No past EKG'

s present to compare with. 

















Departure





- Departure


Time of Disposition: 19:58


Disposition: Admitted As Inpatient 68


Clinical Impression: 


 Intertrochanteric fracture, hip








- Discharge Information





- My Orders


Last 24 Hours: 


My Active Orders





11/13/17 17:43


Hip Min 2V or 3V w Pelvis Rt [CR] Stat 





11/13/17 18:33


Chest 1V Frontal [CR] Stat 





11/13/17 18:34


Urinary Catheter Assessment [RC] ASDIRECTED 





11/13/17 18:45


Mitchell Catheter Insertion [Insert Urinary Catheter] [OM.PC] Stat 


Sodium Chloride 0.9% [Normal Saline] 1,000 ml IV ASDIRECTED 














- Assessment/Plan


Last 24 Hours: 


My Active Orders





11/13/17 17:43


Hip Min 2V or 3V w Pelvis Rt [CR] Stat 





11/13/17 18:33


Chest 1V Frontal [CR] Stat 





11/13/17 18:34


Urinary Catheter Assessment [RC] ASDIRECTED 





11/13/17 18:45


Mitchell Catheter Insertion [Insert Urinary Catheter] [OM.PC] Stat 


Sodium Chloride 0.9% [Normal Saline] 1,000 ml IV ASDIRECTED

## 2017-11-13 NOTE — PCM.HP
H&P History of Present Illness





- General


Date of Service: 11/13/17


Admit Problem/Dx: 


 Admission Diagnosis/Problem





Admission Diagnosis/Problem      Intertrochanteric fracture, hip








Source of Information: Patient, Family, Old Records, RN Notes Reviewed


History Limitations: Reports: Physical Impairment





- History of Present Illness


Initial Comments - Free Text/Narative: 


This is a 71 yo elderly white male with past medical hx/o Impaired Vision, AR, 

CAD S/p 5 Vessel CABG in 1998, HD, HTN, MI in 1997, TSERING not on CPAP, 

Restrictive Lung Disease, Hx/o Mediastinal and Hilar Lymphadenopathy, Hx/o 

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 

Hypothyroidism S/p Thyroidectomy, Chronic Thrombocytopenia, Hyperkalemia and 

Seborrheic Dermatitis who comes in for an evaluation of hip pain after 

sustaining a mechanical fall that took place in his kitchen at home. Patient 

reports no prodromal symptoms. He admits to having bad socks that caused him to 

lose his balance, twist and fall.





Patient carries a baseline right sided residual deficits form his hx/o Multiple 

CVA. His initial work up in ED, shows a CBC that is remarkable for RBC of 4.47, 

MCV of 94.4, platelet of 1:30, monocytes of 12.6%, and eosinophils of 7.4%. His 

PT is 10.9, INR of 1, APTT of 27. his chemistry is remarkable for BUN of 22, 

creatinine of 1.2, glucose of 156, and calcium of 8.3. Chest x-ray shows no 

acute abnormal findings but his hip x-ray shows right inter-trochanteric 

fracture. 





Patient is being admitted for acute right hip fracture. He is DNR/DNI.





  ** Right Hip


Pain Score (Numeric/FACES): 8





- Related Data


Allergies/Adverse Reactions: 


 Allergies











Allergy/AdvReac Type Severity Reaction Status Date / Time


 


ACE Inhibitors AdvReac  Cough Verified 11/13/17 23:07











Home Medications: 


 Home Meds





Acetaminophen/Diphenhydramine [Tylenol Pm Ex-Strength Caplet] 1 tab PO BEDTIME 

PRN 09/09/16 [History]


Aspirin 162 mg PO DAILY 09/09/16 [History]


Fluticasone/Salmeterol [Advair 250-50 Diskus] 1 puff INH BID 09/09/16 [History]


Insulin Detemir [Levemir Flextouch] 6 units SQ BEDTIME 09/09/16 [History]


Insulin Detemir [Levemir Flextouch] 26 units SQ QAM 09/09/16 [History]


Metoprolol Tartrate 25 mg PO BID 09/09/16 [History]


Multivitamin [Multivitamins] 1 each PO DAILY 09/09/16 [History]


Olmesartan [Benicar] 5 mg PO DAILY 09/09/16 [History]


Silver Sulfadiazine [Silvadene 1% Cream 20 GM] 1 applic TOP TID PRN 09/09/16 [

History]


Triamcinolone Acetonide [Kenalog-10] 1 applic TOP BID PRN 09/09/16 [History]


Ubidecarenone [Coq-10] 1 tab PO DAILY 09/09/16 [History]


atorvaSTATin [Lipitor] 40 mg PO BEDTIME 09/09/16 [History]


Levothyroxine 150 mcg PO DAILY 08/22/17 [History]


Melatonin 10 mg PO BEDTIME 08/22/17 [History]











Past Medical History


HEENT History: Reports: Allergic Rhinitis, Impaired Vision, Other (See Below)


Other HEENT History: wears glasses


Cardiovascular History: Reports: CAD, High Cholesterol, Hypertension, MI


Respiratory History: Reports: Sleep Apnea


Other Respiratory History: medialstinal and hilar lympadenopathy, restrictive 

lung disease


Gastrointestinal History: Reports: Colon Polyp, Other (See Below)


Other Gastrointestinal History: Hernia


Genitourinary History: Reports: Other (See Below)


Other Genitourinary History: erectile dysfunction


OB/GYN History: Reports: None


Musculoskeletal History: Reports: Fracture


Other Musculoskeletal History: broken wrist


Neurological History: Reports: CVA, Other (See Below)


Other Neuro History: Right sided weakness


Psychiatric History: Reports: None


Endocrine/Metabolic History: Reports: Diabetes, Type II, Hyperthyroidism, 

Hypothyroidism


Other Endocrine/Metabolic History: thyroid mass


Hematologic History: Reports: Other (See Below)


Other Hematologic History: thrombocytopenia, hyperkalemia


Immunologic History: Reports: None


Oncologic (Cancer) History: Reports: None


Dermatologic History: Reports: Seborrheic Dermatitis





- Past Surgical History


Head Surgeries/Procedures: Reports: None


Cardiovascular Surgical History: Reports: Coronary Artery Bypass


GI Surgical History: Reports: Colonoscopy


Endocrine Surgical History: Reports: Thyroidectomy





Social & Family History





- Family History


Family Medical History: Noncontributory





- Tobacco Use


Smoking Status *Q: Never Smoker


Month Tobacco Last Used: 30 years ago


Second Hand Smoke Exposure: No





- Caffeine Use


Caffeine Use: Reports: Coffee





- Recreational Drug Use


Recreational Drug Use: No


Drug Use in Last 12 Months: No





H&P Review of Systems





- Review of Systems:


Review Of Systems: See Below


General: Denies: Fever, Chills, Malaise, Weakness, Fatigue


HEENT: Reports: No Symptoms


Pulmonary: Denies: Shortness of Breath


Cardiovascular: Denies: Chest Pain


Gastrointestinal: Denies: Abdominal Pain, Nausea, Vomiting


Genitourinary: Reports: No Symptoms


Musculoskeletal: Reports: Other (hip pain )


Skin: Denies: Cyanosis, Jaundice, Mottled, Pallor, Diaphoresis, Bruising, Rash, 

Erythema


Psychiatric: Denies: Depression, Anxiety, Agitation, Hallucinations


Neurological: Reports: Pre-Existing Deficit, Difficulty Walking, Weakness, Gait 

Disturbance.  Denies: Confusion, Dizziness, Numbness, Paresthesia, Seizure, 

Tingling


Hematologic/Lymphatic: Reports: No Symptoms


Immunologic: Reports: No Symptoms





Exam





- Exam


Exam: See Below





- Vital Signs


Vital Signs: 


 Last Vital Signs











Temp  36.3 C   11/13/17 17:02


 


Pulse  66   11/13/17 17:02


 


Resp  18   11/13/17 17:02


 


BP  135/79   11/13/17 17:02


 


Pulse Ox  98   11/13/17 17:02











Weight: 97.522 kg





- Exam


General: Alert, Oriented, Cooperative.  No: Mild Distress


HEENT: Conjunctiva Clear, EACs Clear, EOMI, Hearing Intact, Mucosa Moist & Pink

, Nares Patent, Normal Nasal Septum, Posterior Pharynx Clear, Pupils Equal, 

Pupils Reactive


Neck: Supple, Trachea Midline, +2 Carotid Pulse wo Bruit, Full Range of Motion


Lungs: Clear to Auscultation, Normal Respiratory Effort


Cardiovascular: Regular Rate, Regular Rhythm


GI/Abdominal Exam: Normal Bowel Sounds, Soft, Non-Tender, No Organomegaly, No 

Distention, No Abnormal Bruit, No Mass, Pelvis Stable


 (Male) Exam: Deferred


Rectal (Males) Exam: Deferred


Back Exam: Normal Inspection, Decreased Range of Motion


Extremities: Normal Inspection, Normal Range of Motion (Left leg), Non-Tender (

left lower extremity), No Pedal Edema, Normal Capillary Refill, Leg Pain (right 

hip ), Limited Range of Motion (right leg), Other (Pain with movement on right 

leg).  No: Mottled, Redness


Peripheral Pulses: 2+: Posterior Tibial (L), Posterior Tibial (R), Dorsalis 

Pedis (L), Dorsalis Pedis (R)


Skin: Warm, Dry, Intact


Neuro Extensive - Mental Status: Oriented x3, Normal Cognition, Memory Intact


Neuro Extensive - Motor, Sensory, Reflexes: CN II-XII Intact (limited but 

groslly intact), Abnormal Gait, Other (baseline right sided hemiparesis).  No: 

Motor/Sensory Deficits


Psychiatric: Alert, Normal Affect, Normal Mood





- Patient Data


Result Diagrams: 


 11/13/17 17:50





 11/13/17 17:50





EKG INTERPRETATION


EKG Date: 11/13/17


Time: 07:45


Rhythm: Other (Sinus Rhythm)


Rate (Beats/Min): 76


QRS: RBBB





*Q Meaningful Use (ADM)





- VTE *Q


VTE Criteria *Q: 








- Stroke *Q


Stroke Criteria *Q: 








- AMI *Q


AMI Criteria *Q: 





Problem List Initiated/Reviewed/Updated: Yes


Orders Last 24hrs: 


 Active Orders 24 hr











 Category Date Time Status


 


 Admission Status [Patient Status] [ADT] Routine ADT  11/13/17 19:58 Active


 


 Cardiac Monitoring [RC] .AS DIRECTED Care  11/13/17 19:58 Active








 Medication Orders





Sodium Chloride (Normal Saline)  1,000 mls @ 150 mls/hr IV ASDIRECTED ODALIS


   Last Admin: 11/13/17 19:32  Dose: 150 mls/hr








Assessment/Plan Comment:: 


Assessment/Plan:


Acute:


Right Inter-Trochanteric Fracture


   - 2/2 Mechanical Fall 


   - Fall Precautions


   - Ortho consulted     


   - PRN Pain medication





Pre-Operative Risk Stratification


   - Risk factors: CAD S/p 5 Vessel CABG in 1998, HLD, HTN, Hx/o MI in 1997, TSERING

, Restrictive Lung Disease, DM2, Chronic Thrombocytopenia and 


     Hyperkalemia  


   - METS <4, SVS, EKG/CXR: benign


   - Physical exam fairly benign


   - No recent cardiac or lung eval


   - Has significant/advanced combined cardiac and lung disease based on 

medical hx


   - Based on the data provided, the patient carries a very high cardiac risk 

for intermediate surgical risk (relayed to on-call CRNA)


   - Patient and family understood his medical/surgical risks and the resources 

we have here in [place]


 


Chronic:


Impaired Vision


AR


CAD S/p 5 Vessel CABG in 1998


HD


HTN


MI in 1997


TSERING


Restrictive Lung Disease


Mediastinal and Hilar Lymphadenopathy


Hx/o Hernia s.p Repair


ED


CVA with Right Sided Residual Deficits


DM2


Hx/o Hyperthyroidism 2/2 Thyroid Mass


Hypothyroidism S/p Thyroidectomy


Thrombocytopenia


Hyperkalemia


Seborrheic Dermatitis





Plan:


Admit to Med-Surg w/ Tele


Routine AM labs


Resume Homed Meds


PT/OT consult


IS q2 awake after surgery


H2B for GI ppx


Ortho consult


DVT ppx: SCDs for now  


Code status: DNR/DNI

## 2017-11-14 NOTE — CR
Pelvis and right hip: AP view of the pelvis was obtained as well as AP

 and frog-leg lateral views of the right hip.

 

Comparison: No prior hip exam.

 

Intertrochanteric fracture is noted within the right hip with mild 

varus angulation.  Mild joint space narrowing is seen within both 

hips.  Bony structures are osteopenic.  No additional fracture or 

other bony abnormality is seen.  Vascular calcification is present.

 

Impression:

1.  Intertrochanteric fracture with mild varus angulation.

2.  Other incidental findings.

 

Diagnostic code #3

## 2017-11-14 NOTE — CR
Right hip: 12 fluoroscopic spot views were obtained of the right hip 

in the operating room utilizing C-arm device.

 

Comparison: Previous pelvis right hip exam of 11/13/17.

 

Study shows placement of orthopedic hardware affixing previously noted

 intertrochanteric fracture.  Tip of cannulated screw lies within the 

femoral head.  Previously noted angulation has been corrected.

 

Fluoroscopy time given as 103 seconds.

 

Impression:

1.  Operative study showing reduction and fixation of previous 

intertrochanteric fracture.

 

Diagnostic code #2

## 2017-11-14 NOTE — PCM.PREANE
Preanesthetic Assessment





- Anesthesia/Transfusion/Family Hx


Anesthesia History: Prior Anesthesia Without Reaction


Family History of Anesthesia Reaction: No


Transfusion History: No Prior Transfusion(s)


Intubation History: Unknown





- Review of Systems


General: No Symptoms


Pulmonary: No Symptoms


Cardiovascular: No Symptoms


Gastrointestinal: No Symptoms


Neurological: Change in Speech, Gait Disturbance


Other: Reports: Diabetes (145 this AM), Thyroid Problems (removed this year)





- Physical Assessment


NPO Status Date: 11/13/17


NPO Status Time: 12:00


Pulse: 80


O2 Sat by Pulse Oximetry: 92


Respiratory Rate: 18


Blood Pressure: 136/60


Temperature: 36.7 C


Vital Signs: 





 Last Vital Signs











Temp  36.7 C   11/14/17 08:22


 


Pulse  80   11/14/17 08:44


 


Resp  18   11/14/17 08:22


 


BP  136/60   11/14/17 09:17


 


Pulse Ox  92 L  11/14/17 08:22











Height: 1.8 m


Weight: 100.743 kg


ASA Class: 3


Mental Status: Alert & Oriented x3


Airway Class: Mallampati = 2


Dentition: Reports: Crown(s)


Thyro-Mental Finger Breadths: 3


Mouth Opening Finger Breadths: 3


ROM/Head Extension: Full


Lungs: Clear to Auscultation, Normal Respiratory Effort


Cardiovascular: Regular Rate, Regular Rhythm, No Murmurs





- Lab


Values: 





 Laboratory Last Values











WBC  5.27 K/mm3 (4.23-9.07)   11/14/17  06:12    


 


RBC  4.20 M/mm3 (4.63-6.08)  L  11/14/17  06:12    


 


Hgb  13.6 gm/L (13.7-17.5)  L  11/14/17  06:12    


 


Hct  40.4 % (40.1-51.0)   11/14/17  06:12    


 


MCV  96.2 fl (79.0-92.2)  H  11/14/17  06:12    


 


MCH  32.4 pg (25.7-32.2)  H  11/14/17  06:12    


 


MCHC  33.7 g/dl (32.2-35.5)   11/14/17  06:12    


 


RDW Std Deviation  42.0 fL (35.1-43.9)   11/14/17  06:12    


 


Plt Count  121 K/mm3 (163-337)  L  11/14/17  06:12    


 


MPV  10.4 fl (9.4-12.3)   11/14/17  06:12    


 


Neut % (Auto)  54.8 % (34.0-67.9)   11/14/17  06:12    


 


Lymph % (Auto)  22.2 % (21.8-53.1)   11/14/17  06:12    


 


Mono % (Auto)  16.9 % (5.3-12.2)  H  11/14/17  06:12    


 


Eos % (Auto)  5.1  (0.8-7.0)   11/14/17  06:12    


 


Baso % (Auto)  0.6 % (0.1-1.2)   11/14/17  06:12    


 


Neut # (Auto)  2.89 K/mm3 (1.78-5.38)   11/14/17  06:12    


 


Lymph # (Auto)  1.17 K/mm3 (1.32-3.57)  L  11/14/17  06:12    


 


Mono # (Auto)  0.89 K/mm3 (0.30-0.82)  H  11/14/17  06:12    


 


Eos # (Auto)  0.27 K/mm3 (0.04-0.54)   11/14/17  06:12    


 


Baso # (Auto)  0.03 K/mm3 (0.01-0.08)   11/14/17  06:12    


 


Manual Slide Review  Normal smear   11/14/17  06:12    


 


PT  10.9 SECONDS (8.0-13.0)   11/13/17  17:50    


 


INR  1.00   11/13/17  17:50    


 


APTT  27 SECONDS (22-36)   11/13/17  17:50    


 


Sodium  141 mEq/L (136-145)   11/14/17  06:12    


 


Potassium  4.0 mEq/L (3.5-5.1)   11/14/17  06:12    


 


Chloride  106 mEq/L ()   11/14/17  06:12    


 


Carbon Dioxide  25 mEq/L (21-32)   11/14/17  06:12    


 


Anion Gap  14.0  (5-15)   11/14/17  06:12    


 


BUN  20 mg/dL (7-18)  H  11/14/17  06:12    


 


Creatinine  1.1 mg/dL (0.7-1.3)   11/14/17  06:12    


 


Est Cr Clr Drug Dosing  66.55 mL/min  11/14/17  06:12    


 


Estimated GFR (MDRD)  > 60 mL/min (>60)   11/14/17  06:12    


 


BUN/Creatinine Ratio  18.2  (14-18)  H  11/14/17  06:12    


 


Glucose  158 mg/dL ()  H  11/14/17  06:12    


 


POC Glucose  145 mg/dL ()  H  11/14/17  06:15    


 


Hemoglobin A1c  6.80 % (4.50-6.20)  H  11/14/17  06:52    


 


Calcium  7.8 mg/dL (8.5-10.1)  L  11/14/17  06:12    


 


Magnesium  1.6 mg/dl (1.8-2.4)  L  11/14/17  06:12    


 


Total Bilirubin  0.6 mg/dL (0.2-1.0)   11/13/17  17:50    


 


AST  23 U/L (15-37)   11/13/17  17:50    


 


ALT  35 U/L (16-63)   11/13/17  17:50    


 


Alkaline Phosphatase  99 U/L ()   11/13/17  17:50    


 


Total Protein  6.4 g/dl (6.4-8.2)   11/13/17  17:50    


 


Albumin  3.4 g/dl (3.4-5.0)   11/13/17  17:50    


 


Globulin  3.0 gm/dL  11/13/17  17:50    


 


Albumin/Globulin Ratio  1.1  (1-2)   11/13/17  17:50    


 


TSH 3rd Generation  2.518 uIU/mL (0.358-3.74)   11/14/17  06:52    


 


Blood Type  O POSITIVE   11/13/17  17:50    


 


Gel Antibody Screen  Negative   11/13/17  17:50    














- Imaging/EKG


Impressions: 





 EKG INTERPRETATION 


EKG Date: 11/13/17


Time: 07:45


Rhythm: Other (Sinus Rhythm)


Rate (Beats/Min): 76


QRS: RBBB





Pt informed of increase risk of cardiac complications 





Pt request to have surgery at [facility] in [place]





- Allergies


Allergies/Adverse Reactions: 


 Allergies











Allergy/AdvReac Type Severity Reaction Status Date / Time


 


ACE Inhibitors AdvReac  Cough Verified 11/13/17 23:07














- Blood


Blood Available: Yes


Product(s) Available: PRBC





- Anesthesia Plan


Pre-Op Medication Ordered: Beta Blocker


Beta Blocker: Metoprolol


Med Last Dose Date: 11/14/17


Med Last Dose Time: 08:40





- Acknowledgements


Anesthesia Type Planned: Spinal


Pt an Appropriate Candidate for the Planned Anesthesia: Yes


Alternatives and Risks of Anesthesia Discussed w Pt/Guardian: Yes


Pt/Guardian Understands and Agrees with Anesthesia Plan: Yes





PreAnesthesia Questionnaire


HEENT History: Reports: Allergic Rhinitis, Impaired Vision, Other (See Below)


Other HEENT History: wears glasses


Cardiovascular History: Reports: CAD, High Cholesterol, Hypertension, MI


Respiratory History: Reports: Sleep Apnea


Other Respiratory History: medialstinal and hilar lympadenopathy, restrictive 

lung disease


Gastrointestinal History: Reports: Colon Polyp, Other (See Below)


Other Gastrointestinal History: Hernia


Genitourinary History: Reports: Other (See Below)


Other Genitourinary History: erectile dysfunction


OB/GYN History: Reports: None


Musculoskeletal History: Reports: Fracture


Other Musculoskeletal History: broken wrist


Neurological History: Reports: CVA, Other (See Below)


Other Neuro History: Right sided weakness


Psychiatric History: Reports: None


Endocrine/Metabolic History: Reports: Diabetes, Type II, Hyperthyroidism, 

Hypothyroidism


Other Endocrine/Metabolic History: thyroid mass


Hematologic History: Reports: Other (See Below)


Other Hematologic History: thrombocytopenia, hyperkalemia


Immunologic History: Reports: None


Oncologic (Cancer) History: Reports: None


Dermatologic History: Reports: Seborrheic Dermatitis





- Infectious Disease History


Infectious Disease History: Reports: Chicken Pox, Influenza





- Past Surgical History


Head Surgeries/Procedures: Reports: None


Cardiovascular Surgical History: Reports: Coronary Artery Bypass


GI Surgical History: Reports: Colonoscopy


Endocrine Surgical History: Reports: Thyroidectomy





- SUBSTANCE USE


Smoking Status *Q: Never Smoker


Tobacco Use Within Last Twelve Months: Cigarettes


Second Hand Smoke Exposure: No


Recreational Drug Use History: No





- HOME MEDS


Home Medications: 


 Home Meds





Acetaminophen/Diphenhydramine [Tylenol Pm Ex-Strength Caplet] 1 tab PO BEDTIME 

PRN 09/09/16 [History]


Aspirin 162 mg PO DAILY 09/09/16 [History]


Fluticasone/Salmeterol [Advair 250-50 Diskus] 1 puff INH BID 09/09/16 [History]


Insulin Detemir [Levemir Flextouch] 6 units SQ BEDTIME 09/09/16 [History]


Insulin Detemir [Levemir Flextouch] 26 units SQ QAM 09/09/16 [History]


Metoprolol Tartrate 25 mg PO BID 09/09/16 [History]


Multivitamin [Multivitamins] 1 each PO DAILY 09/09/16 [History]


Olmesartan [Benicar] 5 mg PO DAILY 09/09/16 [History]


Silver Sulfadiazine [Silvadene 1% Cream 20 GM] 1 applic TOP TID PRN 09/09/16 [

History]


Triamcinolone Acetonide [Kenalog-10] 1 applic TOP BID PRN 09/09/16 [History]


Ubidecarenone [Coq-10] 1 tab PO DAILY 09/09/16 [History]


atorvaSTATin [Lipitor] 40 mg PO BEDTIME 09/09/16 [History]


Levothyroxine 150 mcg PO DAILY 08/22/17 [History]


Melatonin 10 mg PO BEDTIME 08/22/17 [History]











- CURRENT (IN HOUSE) MEDS


Current Meds: 





 Current Medications





Acetaminophen (Tylenol)  650 mg PO Q4H PRN


   PRN Reason: Pain (Mild 1-3)/fever


Hydrocodone Bitart/Acetaminophen (Norco 325-5 Mg)  1 tab PO Q4H PRN


   PRN Reason: Pain (moderate 4-6)


   Last Admin: 11/14/17 06:11 Dose:  1 tab


Albuterol/Ipratropium (Duoneb 3.0-0.5 Mg/3 Ml)  3 ml NEB Q4H PRN


   PRN Reason: Shortness Of Breath/wheezing


Aspirin (Halfprin)  162 mg PO DAILY ODALIS


Bisacodyl (Dulcolax)  5 mg PO DAILY PRN


   PRN Reason: Constipation


Dextrose/Water (Dextrose 50% In Water)  50 ml IVPUSH ASDIRECTED PRN


   PRN Reason: Hypoglycemia


Docusate Sodium (Colace)  100 mg PO BID PRN


   PRN Reason: Constipation


Hydromorphone HCl (Dilaudid)  1 mg IVPUSH Q4H PRN


   PRN Reason: Pain (severe 7-10)


   Last Admin: 11/14/17 08:46 Dose:  1 mg


Sodium Chloride (Normal Saline)  1,000 mls @ 150 mls/hr IV ASDIRECTED ODALIS


   Last Admin: 11/14/17 09:11 Dose:  150 mls/hr


Insulin Aspart (Novolog)  0 unit SUBCUT QIDACANDBED [facility]


   PRN Reason: Protocol


   Last Admin: 11/14/17 06:33 Dose:  Not Given


Insulin Detemir (Levemir)  6 unit SUBCUT BEDTIME [facility]


Insulin Detemir (Levemir)  26 unit SUBCUT QAM [facility]


   Last Admin: 11/14/17 08:38 Dose:  26 units


Levothyroxine Sodium (Levothyroxine)  150 mcg PO DAILY@0600 [facility]


   Last Admin: 11/14/17 06:12 Dose:  150 mcg


Lorazepam (Ativan)  0.5 mg IV Q6H PRN


   PRN Reason: Anxiety


Losartan Potassium (Cozaar)  25 mg PO DAILY [facility]


   Last Admin: 11/14/17 09:17 Dose:  Not Given


Metoprolol Tartrate (Lopressor)  25 mg PO BID [facility]


   Last Admin: 11/14/17 08:44 Dose:  25 mg


Multivitamins (Thera)  1 each PO DAILY [facility]


   Last Admin: 11/14/17 09:18 Dose:  Not Given


Ondansetron HCl (Zofran)  4 mg IV Q6H PRN


   PRN Reason: Nausea/Vomiting


Ondansetron HCl (Zofran Odt)  4 mg PO Q4H PRN


   PRN Reason: Nausea/Vomiting


Fluticasone/Salmeterol [Advair 250-50]  0 each INH BID [facility]


   Last Admin: 11/14/17 08:04 Dose:  1 each


Polyethylene Glycol (Miralax)  17 gm PO DAILY PRN


   PRN Reason: Constipation


Rosuvastatin Calcium (Crestor)  10 mg PO BEDTIME [facility]


Senna/Docusate Sodium (Senna Plus)  1 tab PO BID PRN


   PRN Reason: Constipation


Silver Sulfadiazine (Silvadene 1% Cream 50 Gm)  0 gm TOP TID PRN


   PRN Reason: SKIN COMPLICATIONS


Temazepam (Restoril)  15 mg PO BEDTIME PRN


   PRN Reason: Sleep





Discontinued Medications





Hydromorphone HCl (Dilaudid)  0.5 mg IVPUSH NOW STA


   Stop: 11/13/17 17:59


   Last Admin: 11/13/17 18:01 Dose:  0.5 mg


Hydromorphone HCl (Dilaudid)  1 mg IVPUSH ONETIME ONE


   Stop: 11/13/17 18:32


   Last Admin: 11/13/17 18:36 Dose:  1 mg


Promethazine HCl 12.5 mg/ (Sodium Chloride)  50.5 mls @ 100 mls/hr IV Q6H PRN


   PRN Reason: Nausea/Vomiting


Magnesium Oxide (Magnesium Oxide)  800 mg PO ONETIME ONE


   Stop: 11/14/17 09:01


   Last Admin: 11/14/17 09:17 Dose:  Not Given


Non-Formulary Medication (Acetaminophen/Diphenhydramine)  1 tab PO BEDTIME PRN


   PRN Reason: Insomnia


Non-Formulary Medication (Melatonin [Melatonin])  10 mg PO BEDTIME ODALIS


Non-Formulary Medication (Triamcinolone Acetonide [Kenalog-10])  1 applic TOP 

BID PRN


   PRN Reason: skin complications


Non-Formulary Medication (Ubidecarenone)  1 tab PO DAILY ODALIS


Rosuvastatin Calcium (Crestor)  10 mg PO DAILY ODALIS


Silver Sulfadiazine (Silvadene 1% Cream 400 Gm)  0 gm TOP TID PRN


   PRN Reason: SKIN COMPLICATIONS

## 2017-11-14 NOTE — PCM.POSTAN
POST ANESTHESIA ASSESSMENT





- MENTAL STATUS


Mental Status: Alert, Oriented





- VITAL SIGNS


Pulse Rate: 89


SaO2: 91


Resp Rate: 18


Blood Pressure: 89/64


Temperature: 37.4 C





- RESPIRATORY


Respiratory Status: Respiratory Rate WNL, Airway Patent, O2 Saturation Stable, 

Supplemental Oxygen





- CARDIOVASCULAR


CV Status: Pulse Rate WNL, Blood Pressure Stable





- GASTROINTESTINAL


GI Status: No Symptoms





- PAIN


Pain Score: 0





- POST OP HYDRATION


Hydration Status: Adequate & Stable





- OBSERVATIONS


Free Text/Narrative:: 





no anesthesia complications noted

## 2017-11-14 NOTE — PCM.PN
- General Info


Date of Service: 11/14/17


Admission Dx/Problem (Free Text): 


 Admission Diagnosis/Problem





Admission Diagnosis/Problem      Intertrochanteric fracture, hip





Patient and wife are seen this morning. He is doing well, pain to hip is 

controlled with pain medications currently. 


Dr. Ledesma reviewed with patient and wife last evening that he is high cardiac 

risk for planned ORIF of hip fx. He would like to proceed with surgery here in 

[place]. 





I review again with patient and wife, cardiac concerns postoperatively. They 

both are very informed and wish to proceed with surgery today with Dr. Berry. He 

has had hernia repair within the last 4 months here in [place] and did well 

intra and postoperatively; this poses less risk for this surgery. Satnam Page CRNA was also in room for this discussion. 


Functional Status: Reports: Pain Controlled, Urinating.  Denies: Tolerating 

Diet (NPO)





- Review of Systems


General: Denies: Fever


HEENT: Reports: No Symptoms


Pulmonary: Reports: No Symptoms.  Denies: Shortness of Breath, Cough, Wheezing


Cardiovascular: Reports: No Symptoms.  Denies: Chest Pain, Palpitations, 

Dyspnea on Exertion


Gastrointestinal: Reports: No Symptoms.  Denies: Abdominal Pain, Nausea, 

Vomiting


Genitourinary: Reports: No Symptoms


Musculoskeletal: Reports: Leg Pain


Neurological: Reports: No Symptoms


Psychiatric: Reports: No Symptoms





- Patient Data


Vitals - Most Recent: 


 Last Vital Signs











Temp  98.1 F   11/14/17 09:48


 


Pulse  80   11/14/17 09:48


 


Resp  18   11/14/17 09:48


 


BP  136/60   11/14/17 09:48


 


Pulse Ox  92 L  11/14/17 09:48











Weight - Most Recent: 222 lb 1.6 oz


I&O - Last 24 Hours: 


 Intake & Output











 11/13/17 11/14/17 11/14/17





 22:59 06:59 14:59


 


Intake Total  1443 


 


Output Total  600 


 


Balance  843 











Lab Results Last 24 Hours: 


 Laboratory Results - last 24 hr











  11/14/17 11/14/17 11/14/17 Range/Units





  06:12 06:12 06:15 


 


WBC  5.27    (4.23-9.07)  K/mm3


 


RBC  4.20 L    (4.63-6.08)  M/mm3


 


Hgb  13.6 L    (13.7-17.5)  gm/L


 


Hct  40.4    (40.1-51.0)  %


 


MCV  96.2 H    (79.0-92.2)  fl


 


MCH  32.4 H    (25.7-32.2)  pg


 


MCHC  33.7    (32.2-35.5)  g/dl


 


RDW Std Deviation  42.0    (35.1-43.9)  fL


 


Plt Count  121 L    (163-337)  K/mm3


 


MPV  10.4    (9.4-12.3)  fl


 


Neut % (Auto)  54.8    (34.0-67.9)  %


 


Lymph % (Auto)  22.2    (21.8-53.1)  %


 


Mono % (Auto)  16.9 H    (5.3-12.2)  %


 


Eos % (Auto)  5.1    (0.8-7.0)  


 


Baso % (Auto)  0.6    (0.1-1.2)  %


 


Neut # (Auto)  2.89    (1.78-5.38)  K/mm3


 


Lymph # (Auto)  1.17 L    (1.32-3.57)  K/mm3


 


Mono # (Auto)  0.89 H    (0.30-0.82)  K/mm3


 


Eos # (Auto)  0.27    (0.04-0.54)  K/mm3


 


Baso # (Auto)  0.03    (0.01-0.08)  K/mm3


 


Manual Slide Review  Normal smear    


 


Sodium   141   (136-145)  mEq/L


 


Potassium   4.0   (3.5-5.1)  mEq/L


 


Chloride   106   ()  mEq/L


 


Carbon Dioxide   25   (21-32)  mEq/L


 


Anion Gap   14.0   (5-15)  


 


BUN   20 H   (7-18)  mg/dL


 


Creatinine   1.1   (0.7-1.3)  mg/dL


 


Est Cr Clr Drug Dosing   66.55   mL/min


 


Estimated GFR (MDRD)   > 60   (>60)  mL/min


 


BUN/Creatinine Ratio   18.2 H   (14-18)  


 


Glucose   158 H   ()  mg/dL


 


POC Glucose    145 H  ()  mg/dL


 


Hemoglobin A1c     (4.50-6.20)  %


 


Calcium   7.8 L   (8.5-10.1)  mg/dL


 


Magnesium   1.6 L   (1.8-2.4)  mg/dl


 


TSH 3rd Generation     (0.358-3.74)  uIU/mL














  11/14/17 11/14/17 Range/Units





  06:52 06:52 


 


WBC    (4.23-9.07)  K/mm3


 


RBC    (4.63-6.08)  M/mm3


 


Hgb    (13.7-17.5)  gm/L


 


Hct    (40.1-51.0)  %


 


MCV    (79.0-92.2)  fl


 


MCH    (25.7-32.2)  pg


 


MCHC    (32.2-35.5)  g/dl


 


RDW Std Deviation    (35.1-43.9)  fL


 


Plt Count    (163-337)  K/mm3


 


MPV    (9.4-12.3)  fl


 


Neut % (Auto)    (34.0-67.9)  %


 


Lymph % (Auto)    (21.8-53.1)  %


 


Mono % (Auto)    (5.3-12.2)  %


 


Eos % (Auto)    (0.8-7.0)  


 


Baso % (Auto)    (0.1-1.2)  %


 


Neut # (Auto)    (1.78-5.38)  K/mm3


 


Lymph # (Auto)    (1.32-3.57)  K/mm3


 


Mono # (Auto)    (0.30-0.82)  K/mm3


 


Eos # (Auto)    (0.04-0.54)  K/mm3


 


Baso # (Auto)    (0.01-0.08)  K/mm3


 


Manual Slide Review    


 


Sodium    (136-145)  mEq/L


 


Potassium    (3.5-5.1)  mEq/L


 


Chloride    ()  mEq/L


 


Carbon Dioxide    (21-32)  mEq/L


 


Anion Gap    (5-15)  


 


BUN    (7-18)  mg/dL


 


Creatinine    (0.7-1.3)  mg/dL


 


Est Cr Clr Drug Dosing    mL/min


 


Estimated GFR (MDRD)    (>60)  mL/min


 


BUN/Creatinine Ratio    (14-18)  


 


Glucose    ()  mg/dL


 


POC Glucose    ()  mg/dL


 


Hemoglobin A1c   6.80 H  (4.50-6.20)  %


 


Calcium    (8.5-10.1)  mg/dL


 


Magnesium    (1.8-2.4)  mg/dl


 


TSH 3rd Generation  2.518   (0.358-3.74)  uIU/mL











Med Orders - Current: 


 Current Medications





Acetaminophen (Tylenol)  650 mg PO Q4H PRN


   PRN Reason: Pain (Mild 1-3)/fever


Hydrocodone Bitart/Acetaminophen (Norco 325-5 Mg)  1 tab PO Q4H PRN


   PRN Reason: Pain (moderate 4-6)


   Last Admin: 11/14/17 06:11 Dose:  1 tab


Albuterol/Ipratropium (Duoneb 3.0-0.5 Mg/3 Ml)  3 ml NEB Q4H PRN


   PRN Reason: Shortness Of Breath/wheezing


Aspirin (Halfprin)  162 mg PO DAILY [facility]


Bisacodyl (Dulcolax)  5 mg PO DAILY PRN


   PRN Reason: Constipation


Dextrose/Water (Dextrose 50% In Water)  50 ml IVPUSH ASDIRECTED PRN


   PRN Reason: Hypoglycemia


Docusate Sodium (Colace)  100 mg PO BID PRN


   PRN Reason: Constipation


Hydromorphone HCl (Dilaudid)  1 mg IVPUSH Q4H PRN


   PRN Reason: Pain (severe 7-10)


   Last Admin: 11/14/17 08:46 Dose:  1 mg


Sodium Chloride (Normal Saline)  1,000 mls @ 150 mls/hr IV ASDIRECTED [facility]


   Last Admin: 11/14/17 09:11 Dose:  150 mls/hr


Insulin Aspart (Novolog)  0 unit SUBCUT QIDACANDBED [facility]


   PRN Reason: Protocol


   Last Admin: 11/14/17 06:33 Dose:  Not Given


Insulin Detemir (Levemir)  6 unit SUBCUT BEDTIME [facility]


Insulin Detemir (Levemir)  26 unit SUBCUT QAM [facility]


   Last Admin: 11/14/17 08:38 Dose:  26 units


Levothyroxine Sodium (Levothyroxine)  150 mcg PO DAILY@0600 [facility]


   Last Admin: 11/14/17 06:12 Dose:  150 mcg


Lorazepam (Ativan)  0.5 mg IV Q6H PRN


   PRN Reason: Anxiety


Losartan Potassium (Cozaar)  25 mg PO DAILY [facility]


   Last Admin: 11/14/17 09:17 Dose:  Not Given


Metoprolol Tartrate (Lopressor)  25 mg PO BID [facility]


   Last Admin: 11/14/17 08:44 Dose:  25 mg


Multivitamins (Thera)  1 each PO DAILY [facility]


   Last Admin: 11/14/17 09:18 Dose:  Not Given


Ondansetron HCl (Zofran)  4 mg IV Q6H PRN


   PRN Reason: Nausea/Vomiting


Ondansetron HCl (Zofran Odt)  4 mg PO Q4H PRN


   PRN Reason: Nausea/Vomiting


Fluticasone/Salmeterol [Advair 250-50]  0 each INH BID [facility]


   Last Admin: 11/14/17 08:04 Dose:  1 each


Polyethylene Glycol (Miralax)  17 gm PO DAILY PRN


   PRN Reason: Constipation


Rosuvastatin Calcium (Crestor)  10 mg PO BEDTIME [facility]


Senna/Docusate Sodium (Senna Plus)  1 tab PO BID PRN


   PRN Reason: Constipation


Silver Sulfadiazine (Silvadene 1% Cream 50 Gm)  0 gm TOP TID PRN


   PRN Reason: SKIN COMPLICATIONS


Temazepam (Restoril)  15 mg PO BEDTIME PRN


   PRN Reason: Sleep





Discontinued Medications





Bupivacaine HCl (Marcaine 0.25%) Confirm Administered Dose 30 ml .ROUTE .STK-

MED ONE


   Stop: 11/14/17 10:25


Cefazolin Sodium (Ancef) Confirm Administered Dose 2 gm .ROUTE .STK-MED ONE


   Stop: 11/14/17 10:44


Fentanyl (Sublimaze) Confirm Administered Dose 100 mcg .ROUTE .STK-MED ONE


   Stop: 11/14/17 10:44


Hydromorphone HCl (Dilaudid)  0.5 mg IVPUSH NOW STA


   Stop: 11/13/17 17:59


   Last Admin: 11/13/17 18:01 Dose:  0.5 mg


Hydromorphone HCl (Dilaudid)  1 mg IVPUSH ONETIME ONE


   Stop: 11/13/17 18:32


   Last Admin: 11/13/17 18:36 Dose:  1 mg


Promethazine HCl 12.5 mg/ (Sodium Chloride)  50.5 mls @ 100 mls/hr IV Q6H PRN


   PRN Reason: Nausea/Vomiting


Lidocaine HCl (Xylocaine-Mpf 1%) Confirm Administered Dose 4 mls @ as directed 

.ROUTE .STK-MED ONE


   Stop: 11/14/17 10:44


Magnesium Oxide (Magnesium Oxide)  800 mg PO ONETIME ONE


   Stop: 11/14/17 09:01


   Last Admin: 11/14/17 09:17 Dose:  Not Given


Midazolam HCl (Versed 1 Mg/Ml) Confirm Administered Dose 2 mg .ROUTE .STK-MED 

ONE


   Stop: 11/14/17 10:44


Non-Formulary Medication (Acetaminophen/Diphenhydramine)  1 tab PO BEDTIME PRN


   PRN Reason: Insomnia


Non-Formulary Medication (Melatonin [Melatonin])  10 mg PO BEDTIME [facility]


Non-Formulary Medication (Triamcinolone Acetonide [Kenalog-10])  1 applic TOP 

BID PRN


   PRN Reason: skin complications


Non-Formulary Medication (Ubidecarenone)  1 tab PO DAILY [facility]


Ondansetron HCl (Zofran) Confirm Administered Dose 4 mg .ROUTE .STK-MED ONE


   Stop: 11/14/17 10:44


Propofol (Diprivan  20 Ml) Confirm Administered Dose 200 mg .ROUTE .STK-MED ONE


   Stop: 11/14/17 10:44


Rosuvastatin Calcium (Crestor)  10 mg PO DAILY [facility]


Silver Sulfadiazine (Silvadene 1% Cream 400 Gm)  0 gm TOP TID PRN


   PRN Reason: SKIN COMPLICATIONS











- Exam


Quality Assessment: DVT Prophylaxis (Teds/SCD's)


General: Alert, Oriented, Cooperative, No Acute Distress


HEENT: Pupils Equal, EOMI, Mucous Membr. Moist/Pink


Neck: Supple


Lungs: Clear to Auscultation, Normal Respiratory Effort


Cardiovascular: Regular Rate, Regular Rhythm


GI/Abdominal Exam: Normal Bowel Sounds, Soft, Non-Tender


 (Male) Exam: Deferred


Extremities: No Pedal Edema, Other (Teds/SCD's; CMS + and = distally)


Neurological: No New Focal Deficit


Psy/Mental Status: Alert, Normal Affect, Normal Mood





- Problem List & Annotations


(1) Intertrochanteric fracture, hip


SNOMED Code(s): 757899021


   Code(s): S72.143A - DISPLACED INTERTROCHANTERIC FRACTURE OF UNSP FEMUR, INIT

   Status: Acute   Priority: High   Current Visit: Yes   





(2) Fall at home


SNOMED Code(s): 41483937


   Code(s): W19.XXXA - UNSPECIFIED FALL, INITIAL ENCOUNTER; Y92.099 - UNSP 

PLACE IN OTH NON-INSTITUTIONAL RESIDENCE AS PLACE   Status: Acute   Priority: 

High   Current Visit: Yes   


Qualifiers: 


   Encounter type: initial encounter   Qualified Code(s): W19.XXXA - 

Unspecified fall, initial encounter; Y92.099 - Unspecified place in other non-

institutional residence as the place of occurrence of the external cause; 

Y92.099 - Unspecified place in other non-institutional residence as the place 

of occurrence of the external cause   





(3) CAD (coronary artery disease)


SNOMED Code(s): 86668007


   Code(s): I25.10 - ATHSCL HEART DISEASE OF NATIVE CORONARY ARTERY W/O ANG 

PCTRS   Status: Chronic   Priority: Medium   Current Visit: Yes   


Qualifiers: 


   Coronary Disease-Associated Artery/Lesion type: unspecified vessel or lesion 

type   Native vs. transplanted heart: native heart   Associated angina: without 

angina   Qualified Code(s): I25.10 - Atherosclerotic heart disease of native 

coronary artery without angina pectoris   





(4) HTN (hypertension)


SNOMED Code(s): 00902149


   Code(s): I10 - ESSENTIAL (PRIMARY) HYPERTENSION   Status: Chronic   Priority

: Medium   Current Visit: Yes   


Qualifiers: 


   Hypertension type: essential hypertension   Qualified Code(s): I10 - 

Essential (primary) hypertension   





(5) HLD (hyperlipidemia)


SNOMED Code(s): 83000869


   Code(s): E78.5 - HYPERLIPIDEMIA, UNSPECIFIED   Status: Chronic   Priority: 

Low   Current Visit: Yes   


Qualifiers: 


   Hyperlipidemia type: unspecified   Qualified Code(s): E78.5 - Hyperlipidemia

, unspecified   





- Problem List Review


Problem List Initiated/Reviewed/Updated: Yes





- My Orders


Last 24 Hours: 


My Active Orders





11/14/17 07:13


Consult to Dietician [CONS] Routine 





11/14/17 07:15


Ondansetron [Zofran ODT]   4 mg PO Q4H PRN 





11/14/17 07:16


RT Incentive Spirometry [RC] Q2HWA 


Turn, Cough, Deep Breathe [RC] Q2HWA 





11/15/17 07:12


Chest wo Cont [CT] Routine 














- Plan


Plan:: 


Assessment/Plan:


Acute:


Right Inter-Trochanteric Fracture


   - 2/2 Mechanical Fall at home


   - Fall Precautions


   - Ortho consulted- Dr. Berry consulted, plan for ORIF today at 11:30


   - Pain control


      Ortho to assume pain management and DVT prophylax s/p ORIF





         Pre-Operative Risk Stratification completed per Dr. Ledesma as noted 

below: 


         - Risk factors: CAD S/p 5 Vessel CABG in 1998, HLD, HTN, Hx/o MI in 

1997, TSERING, Restrictive Lung Disease, DM2, Chronic                              

                          Thrombocytopenia and Hyperkalemia  


         - METS <4, SVS


         - EKG/CXR: benign; - Physical exam fairly benign


         - No recent cardiac or lung eval---Does Follow with Cardiology and 

Pulmonology at [facility]


         - Has significant/advanced combined cardiac and lung disease based on 

medical hx


         - Based on the data provided, the patient carries a very high cardiac 

risk for intermediate surgical risk (relayed to on-call CRNA)


         - Patient and family understood his medical/surgical risks and the 

resources we have here in [place] 


         - Patient and wife wish to proceed with surgery here despite above 

noted discussion and known lack of Cardiology services 


   -RT/IS/C&DB Q2H while awake


   -Hgb stable at 14.4-->13.6 today preoperatively





 








Chronic:  Resume Home medications postoperatively today:


CAD S/p 5 Vessel CABG in 1998, AMI in 1997


HTN


TSERING


Restrictive Lung Disease


Mediastinal and Hilar Lymphadenopathy


Hx/o Hernia s.p Repair within the last 4 months, tolerated surgery well


CVA with Right Sided Residual Deficits


DM2--A1C good at 6.8


Hx/o Hyperthyroidism 2/2 Thyroid Mass---recent thyroidectomy within the last 6+ 

months--TSH good at 2.518


Thrombocytopenia- chronic and stable


Hyperkalemia- therapeutic and stable currently


Seborrheic Dermatitis


Visual loss


ED





Plan:


Admit to Med-Surg w/ Tele


Routine AM labs


Resume Homed Meds


PT/OT consult postoperatively


IS q2 awake after surgery


H2B for GI ppx


Ortho consult- Dr. Berry--spoke with Dr. Berry this morning, updated on above 

noted POC and proceed with surgery today. 


DVT ppx: SCDs for now; DVT prophylax per Ortho after ORIF 





Code status: DNR/DNI





PCP is Dr. Rody Wallis, Cardiology follows Dolores Banks PA-C with [facility]

, Pulmonology is Dr. Allred.

## 2017-11-14 NOTE — CR
Chest: Portable view of the chest was obtained.

 

Comparison: No prior chest x-ray.

 

Heart size is normal.  Tortuous thoracic aorta is seen.  Nodular 

density is noted within the left lung base.  Lungs otherwise are 

clear.  Previous sternotomy noted.  Surgical clips are seen at the 

base of the neck.

 

Impression:

1.  Nodular density within the left lung base.  Noncontrast chest CT 

recommended sometime in the future to hopefully rule out mass.

2.  Nothing acute is seen with other incidental findings.

 

Diagnostic code #9

## 2017-11-15 NOTE — PCM.OPNOTE
- General Post-Op/Procedure Note


Date of Surgery/Procedure: 11/14/17


Operative Procedure(s): cephalomedullary nailing of right intertrochanteric hip 

fracture


Pre Op Diagnosis: right intertrochanteric hip fracture


Post-Op Diagnosis: Same


Anesthesia Technique: Local, MAC, Spinal


Primary Surgeon: Douglas Berry


Anesthesia Provider: Moris Page


Assistant: Hellen Schmidt


EBL in mLs: 450


Complications: None


Condition: Good


Free Text/Narrative:: 


 Intake & Output











 11/15/17 11/15/17 11/15/17





 06:59 14:59 22:59


 


Intake Total 1680 620 500


 


Output Total 1025  650


 


Balance 652 547 -150

## 2017-11-15 NOTE — OR
DATE OF OPERATION:  11/14/2017

 

SURGEON:  Douglas Brery MD

 

OPERATION PERFORMED:  Cephalomedullary nailing of right intertrochanteric hip

fracture.

 

PREOPERATIVE DIAGNOSIS:

Right intertrochanteric hip fracture.

 

POSTOPERATIVE DIAGNOSIS:

Right intertrochanteric hip fracture.

 

ANESTHESIA:

Local MAC with spinal.

 

ANESTHESIA PROVIDER:

Moris Page CRNA.

 

ASSISTANT:

Hellen Schmidt LPN.

 

ESTIMATED BLOOD LOSS:

450 mL.

 

COMPLICATIONS:

None.

 

CONDITION:

Stable.

 

DESCRIPTION OF PROCEDURE:

The patient was identified in the preoperative holding area.  Proper site was

marked and identified by the surgeon.  The patient was taken back to the

operating theater where after adequate anesthesia with the spinal, the patient

was placed supine on a traction table.  Both limbs were placed in the traction

boots.  The left lower extremity had only gross traction applied and was placed

in a dependent position.  The right lower extremity had gross and then fine

traction applied under C-arm fluoroscopy until it was reduced on both AP and

lateral views.  At this time, the right hip was then sterilely prepped and

draped in the usual sterile fashion.  OR time-out was performed.  The patient

received 2 g of IV Ancef.  At this time, standard incision was made superior to

greater trochanter.  The guidepin, starting pin was placed at the tip of the

greater trochanter.  This was then placed intramedullary.  Starting reamer was

then used.  A short Bhumi Gamma nail was then placed and was placed in proper

position.  At this time, the cephalic screw incision was made laterally and then

the guidepin was placed up into the head and neck.  It was noted to be in the

center of the neck although was slightly anteriorly in the center of the head,

but did tip apex distance on both AP and lateral views which showed to be less

than 25 mm still.  At this time, the step reamer was used to 110 mm and 110 mm

cephalic lag screw was used.  It was found to be in adequate position at this

time.  The distal interlock screw guide was placed.  Incision was made.  These

were placed down and a drill was used to place this bicortically.  A 6 x 5 x 40

mm screw was then placed distally and found to have bicortical contact.  At this

time, AP and lateral views showed anatomic reduction of the previous

intertrochanteric hip fracture.  At this time, all incisions were irrigated with

normal saline, 0 Vicryl was used for closure of the gluteal fascia, 2-0 Vicryl

was used subcutaneously and staples were used for the skin.  Mepilex dressings

were applied.  The patient was sent to PACU in stable condition.

 

DD:  11/15/2017 21:32:41

DT:  11/15/2017 22:33:54  MMODAL

Job #:  [id number]

## 2017-11-15 NOTE — PCM.CONS
H&P History of Present Illness





- General


Date of Service: 11/13/17


Admit Problem/Dx: 


 Admission Diagnosis/Problem





Admission Diagnosis/Problem      Intertrochanteric fracture, hip





POD #1 ORIF rt hip fx. Review intraoperative images with patient and wife this 

morning. 


VSS.


Working with therapies; moving a little better this morning


Pain under fair control- ok at rest still "10/10" with any movement.


Hgb 11.9 (13.6 preop), no CP, SOB, palpitations, dizziness. 





Source of Information: Patient, Family, Provider


History Limitations: Reports: No Limitations





- History of Present Illness


Initial Comments - Free Text/Narative: 





This is a 72 year old gentleman who fell when the edge of his  socks hit 

the carpet and he fell on his right hip region. He had immediate pain and was 

unable to ambulate. He denies previous hip pain to the right hip before this. 

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 

been doing well and lives independently with his wife at their home. He does 

have a walker that he uses for longer distances outside the home. He denies any 

other pain. He was taken to the Ed where he was found to have a right 

intertrochanteric hip fracture and was admitted to medicine and we were 

consulted.


  ** Right Hip


Pain Score (Numeric/FACES): 5





- Related Data


Allergies/Adverse Reactions: 


 Allergies











Allergy/AdvReac Type Severity Reaction Status Date / Time


 


ACE Inhibitors AdvReac  Cough Verified 11/13/17 23:07











Home Medications: 


 Home Meds





Acetaminophen/Diphenhydramine [Tylenol Pm Ex-Strength Caplet] 1 tab PO BEDTIME 

PRN 09/09/16 [History]


Aspirin 162 mg PO DAILY 09/09/16 [History]


Fluticasone/Salmeterol [Advair 250-50 Diskus] 1 puff INH BID 09/09/16 [History]


Insulin Detemir [Levemir Flextouch] 6 units SQ BEDTIME 09/09/16 [History]


Insulin Detemir [Levemir Flextouch] 26 units SQ QAM 09/09/16 [History]


Metoprolol Tartrate 25 mg PO BID 09/09/16 [History]


Multivitamin [Multivitamins] 1 each PO DAILY 09/09/16 [History]


Olmesartan [Benicar] 5 mg PO DAILY 09/09/16 [History]


Silver Sulfadiazine [Silvadene 1% Cream 20 GM] 1 applic TOP TID PRN 09/09/16 [

History]


Triamcinolone Acetonide [Kenalog-10] 1 applic TOP BID PRN 09/09/16 [History]


Ubidecarenone [Coq-10] 1 tab PO DAILY 09/09/16 [History]


atorvaSTATin [Lipitor] 40 mg PO BEDTIME 09/09/16 [History]


Levothyroxine 150 mcg PO DAILY 08/22/17 [History]


Melatonin 10 mg PO BEDTIME 08/22/17 [History]











Past Medical History


HEENT History: Reports: Allergic Rhinitis, Impaired Vision, Other (See Below)


Other HEENT History: wears glasses


Cardiovascular History: Reports: CAD, High Cholesterol, Hypertension, MI


Respiratory History: Reports: Sleep Apnea


Other Respiratory History: medialstinal and hilar lympadenopathy, restrictive 

lung disease


Gastrointestinal History: Reports: Colon Polyp, Other (See Below)


Other Gastrointestinal History: Hernia


Genitourinary History: Reports: Other (See Below)


Other Genitourinary History: erectile dysfunction


OB/GYN History: Reports: None


Musculoskeletal History: Reports: Fracture


Other Musculoskeletal History: broken wrist


Neurological History: Reports: CVA, Other (See Below)


Other Neuro History: Right sided weakness


Psychiatric History: Reports: None


Endocrine/Metabolic History: Reports: Diabetes, Type II, Hyperthyroidism, 

Hypothyroidism


Other Endocrine/Metabolic History: thyroid mass


Hematologic History: Reports: Other (See Below)


Other Hematologic History: thrombocytopenia, hyperkalemia


Immunologic History: Reports: None


Oncologic (Cancer) History: Reports: None


Dermatologic History: Reports: Seborrheic Dermatitis





- Infectious Disease History


Infectious Disease History: Reports: Chicken Pox, Influenza





- Past Surgical History


Head Surgeries/Procedures: Reports: None


Cardiovascular Surgical History: Reports: Coronary Artery Bypass


GI Surgical History: Reports: Colonoscopy


Endocrine Surgical History: Reports: Thyroidectomy





Social & Family History





- Family History


Family Medical History: Noncontributory


HEENT: Reports: Cataract, Impaired Vision


Cardiac: Reports: Hypertension, MI


Musculoskeletal: Reports: Arthritis


Other Musculoskeletal Family History: Hip replacement - father.


Endocrine/Metabolic: Reports: Diabetes, type II, Hypothyroidism


Other Endocrine/Metabolic Family History: Thyroid problems - dad, sister, and 

neices.


Oncologic: Reports: Breast


Other Oncologic Family History: Mother - breast cancer





- Tobacco Use


Smoking Status *Q: Never Smoker


Years of Tobacco use: 30


Used Tobacco, but Quit: Yes


Month Tobacco Last Used: 30 years ago


Second Hand Smoke Exposure: No





- Caffeine Use


Caffeine Use: Reports: Coffee





- Recreational Drug Use


Recreational Drug Use: No


Drug Use in Last 12 Months: No





H&P Review of Systems





- Review of Systems:


Review Of Systems: ROS reveals no pertinent complaints other than HPI.





Exam





- Exam


Exam: See Below





- Vital Signs


Vital Signs: 


 Last Vital Signs











Temp  37.1 C   11/15/17 19:39


 


Pulse  71   11/15/17 19:39


 


Resp  16   11/15/17 19:39


 


BP  110/63   11/15/17 19:39


 


Pulse Ox  94 L  11/15/17 19:39











Weight: 101.423 kg





- Exam


Physical Exam Comments:: 





Pelvis: stable to AP and lateral compression





RLE: no tenderness to right knee, leg is shortened and externally rotated 

compared to the contralateral side, he is otherwise neurovascularly intact 

distally to the medial, lateral, plantar, first dorsal web space, 2+ distal 

pulses





BUE: patient moving bilateral upper extremities with no signs of trauma





- Patient Data


Lab Results Last 24 hrs: 


 Laboratory Results - last 24 hr











  11/15/17 11/15/17 11/15/17 Range/Units





  05:55 05:55 06:04 


 


WBC  5.53    (4.23-9.07)  K/mm3


 


RBC  3.69 L    (4.63-6.08)  M/mm3


 


Hgb  11.9 L    (13.7-17.5)  gm/L


 


Hct  35.9 L    (40.1-51.0)  %


 


MCV  97.3 H    (79.0-92.2)  fl


 


MCH  32.2    (25.7-32.2)  pg


 


MCHC  33.1    (32.2-35.5)  g/dl


 


RDW Std Deviation  42.7    (35.1-43.9)  fL


 


Plt Count  99 L    (163-337)  K/mm3


 


MPV  10.3    (9.4-12.3)  fl


 


Neut % (Auto)  60.8    (34.0-67.9)  %


 


Lymph % (Auto)  16.3 L    (21.8-53.1)  %


 


Mono % (Auto)  13.7 H    (5.3-12.2)  %


 


Eos % (Auto)  8.3 H    (0.8-7.0)  


 


Baso % (Auto)  0.5    (0.1-1.2)  %


 


Neut # (Auto)  3.36    (1.78-5.38)  K/mm3


 


Lymph # (Auto)  0.90 L    (1.32-3.57)  K/mm3


 


Mono # (Auto)  0.76    (0.30-0.82)  K/mm3


 


Eos # (Auto)  0.46    (0.04-0.54)  K/mm3


 


Baso # (Auto)  0.03    (0.01-0.08)  K/mm3


 


Manual Slide Review  Abnormal smear    


 


Sodium   138   (136-145)  mEq/L


 


Potassium   4.3   (3.5-5.1)  mEq/L


 


Chloride   105   ()  mEq/L


 


Carbon Dioxide   28   (21-32)  mEq/L


 


Anion Gap   9.3   (5-15)  


 


BUN   18   (7-18)  mg/dL


 


Creatinine   1.0   (0.7-1.3)  mg/dL


 


Est Cr Clr Drug Dosing   73.21   mL/min


 


Estimated GFR (MDRD)   > 60   (>60)  mL/min


 


BUN/Creatinine Ratio   18.0   (14-18)  


 


Glucose   150 H   ()  mg/dL


 


POC Glucose    131 H  ()  mg/dL


 


Calcium   7.7 L   (8.5-10.1)  mg/dL


 


Magnesium   1.8   (1.8-2.4)  mg/dl














  11/15/17 11/15/17 11/15/17 Range/Units





  11:59 17:08 20:58 


 


WBC     (4.23-9.07)  K/mm3


 


RBC     (4.63-6.08)  M/mm3


 


Hgb     (13.7-17.5)  gm/L


 


Hct     (40.1-51.0)  %


 


MCV     (79.0-92.2)  fl


 


MCH     (25.7-32.2)  pg


 


MCHC     (32.2-35.5)  g/dl


 


RDW Std Deviation     (35.1-43.9)  fL


 


Plt Count     (163-337)  K/mm3


 


MPV     (9.4-12.3)  fl


 


Neut % (Auto)     (34.0-67.9)  %


 


Lymph % (Auto)     (21.8-53.1)  %


 


Mono % (Auto)     (5.3-12.2)  %


 


Eos % (Auto)     (0.8-7.0)  


 


Baso % (Auto)     (0.1-1.2)  %


 


Neut # (Auto)     (1.78-5.38)  K/mm3


 


Lymph # (Auto)     (1.32-3.57)  K/mm3


 


Mono # (Auto)     (0.30-0.82)  K/mm3


 


Eos # (Auto)     (0.04-0.54)  K/mm3


 


Baso # (Auto)     (0.01-0.08)  K/mm3


 


Manual Slide Review     


 


Sodium     (136-145)  mEq/L


 


Potassium     (3.5-5.1)  mEq/L


 


Chloride     ()  mEq/L


 


Carbon Dioxide     (21-32)  mEq/L


 


Anion Gap     (5-15)  


 


BUN     (7-18)  mg/dL


 


Creatinine     (0.7-1.3)  mg/dL


 


Est Cr Clr Drug Dosing     mL/min


 


Estimated GFR (MDRD)     (>60)  mL/min


 


BUN/Creatinine Ratio     (14-18)  


 


Glucose     ()  mg/dL


 


POC Glucose  153 H  188 H  129 H  ()  mg/dL


 


Calcium     (8.5-10.1)  mg/dL


 


Magnesium     (1.8-2.4)  mg/dl











Result Diagrams: 


 11/15/17 05:55





 11/15/17 05:55





Consult PN Assessment/Plan


Procedures: 


Procedures





ASSAY OF LIPASE (08/22/17)


COLONOSCOPY AND BIOPSY (09/12/16)


COMPLETE CBC W/AUTO DIFF WBC (08/22/17)


COMPREHEN METABOLIC PANEL (08/22/17)


CT ABD & PELVIS W/O CONTRAST (08/22/17)


EMERGENCY DEPT VISIT (08/22/17)


GLUCOSE BLOOD TEST (09/20/17)


HYDRATE IV INFUSION ADD-ON (08/22/17)


MR-STAPH DNA AMP PROBE (08/24/17)


PRP I/RADHA INIT REDUC >5 YR (09/20/17)


REMOVAL OF SPERM CORD LESION (09/20/17)


ROUTINE VENIPUNCTURE (08/22/17)


THER/PROPH/DIAG INJ IV PUSH (08/22/17)


TISSUE EXAM BY PATHOLOGIST (09/12/16)


TX/PRO/DX INJ NEW DRUG ADDON (08/22/17)


URINALYSIS AUTO W/SCOPE (08/22/17)








Problem List Initiated/Reviewed/Updated: Yes


Plan: 





A: Right intertrochanteric hip fracture





P: At this time patient will be worked up from a medical stand point for 

surgery. The patient and his wife were informed that this is usually a fracture 

of necessity and will require surgical intervention to allow for independent 

ambulation again. Patient and wife had the risks, benefits, complications, and 

alternatives discusses and we will proceed with cephalomedullary nailing of the 

right hip when cleared medically. The patient will remain NPO and bed rest at 

this time. We will plan on surgical intervention sometimes in the next 24 hours.

## 2017-11-15 NOTE — PCM.SURGPN
- General Info


Date of Service: 11/15/17


POD#: 1


Functional Status: Reports: Pain Controlled, Tolerating Diet, Ambulating, 

Urinating





- Review of Systems


Musculoskeletal: Reports: Other (The pt states he has noted muscle spasms at 

thigh.  Flexeril was ordered per Hospitalist service.)





- Patient Data


Vitals - Most Recent: 


 Last Vital Signs











Temp  98.8 F   11/15/17 12:01


 


Pulse  85   11/15/17 12:01


 


Resp  20   11/15/17 12:01


 


BP  112/60   11/15/17 12:01


 


Pulse Ox  92 L  11/15/17 15:40











Weight - Most Recent: 223 lb 9.6 oz


I&O - Last 24 Hours: 


 Intake & Output











 11/15/17 11/15/17 11/15/17





 06:59 14:59 22:59


 


Intake Total 1680 620 


 


Output Total 1025  


 


Balance 655 620 











Lab Results Last 24 Hrs: 


 Laboratory Results - last 24 hr











  11/14/17 11/15/17 11/15/17 Range/Units





  20:25 05:55 05:55 


 


WBC   5.53   (4.23-9.07)  K/mm3


 


RBC   3.69 L   (4.63-6.08)  M/mm3


 


Hgb   11.9 L   (13.7-17.5)  gm/L


 


Hct   35.9 L   (40.1-51.0)  %


 


MCV   97.3 H   (79.0-92.2)  fl


 


MCH   32.2   (25.7-32.2)  pg


 


MCHC   33.1   (32.2-35.5)  g/dl


 


RDW Std Deviation   42.7   (35.1-43.9)  fL


 


Plt Count   99 L   (163-337)  K/mm3


 


MPV   10.3   (9.4-12.3)  fl


 


Neut % (Auto)   60.8   (34.0-67.9)  %


 


Lymph % (Auto)   16.3 L   (21.8-53.1)  %


 


Mono % (Auto)   13.7 H   (5.3-12.2)  %


 


Eos % (Auto)   8.3 H   (0.8-7.0)  


 


Baso % (Auto)   0.5   (0.1-1.2)  %


 


Neut # (Auto)   3.36   (1.78-5.38)  K/mm3


 


Lymph # (Auto)   0.90 L   (1.32-3.57)  K/mm3


 


Mono # (Auto)   0.76   (0.30-0.82)  K/mm3


 


Eos # (Auto)   0.46   (0.04-0.54)  K/mm3


 


Baso # (Auto)   0.03   (0.01-0.08)  K/mm3


 


Manual Slide Review   Abnormal smear   


 


Sodium    138  (136-145)  mEq/L


 


Potassium    4.3  (3.5-5.1)  mEq/L


 


Chloride    105  ()  mEq/L


 


Carbon Dioxide    28  (21-32)  mEq/L


 


Anion Gap    9.3  (5-15)  


 


BUN    18  (7-18)  mg/dL


 


Creatinine    1.0  (0.7-1.3)  mg/dL


 


Est Cr Clr Drug Dosing    73.21  mL/min


 


Estimated GFR (MDRD)    > 60  (>60)  mL/min


 


BUN/Creatinine Ratio    18.0  (14-18)  


 


Glucose    150 H  ()  mg/dL


 


POC Glucose  177 H    ()  mg/dL


 


Calcium    7.7 L  (8.5-10.1)  mg/dL


 


Magnesium    1.8  (1.8-2.4)  mg/dl














  11/15/17 11/15/17 11/15/17 Range/Units





  06:04 11:59 17:08 


 


WBC     (4.23-9.07)  K/mm3


 


RBC     (4.63-6.08)  M/mm3


 


Hgb     (13.7-17.5)  gm/L


 


Hct     (40.1-51.0)  %


 


MCV     (79.0-92.2)  fl


 


MCH     (25.7-32.2)  pg


 


MCHC     (32.2-35.5)  g/dl


 


RDW Std Deviation     (35.1-43.9)  fL


 


Plt Count     (163-337)  K/mm3


 


MPV     (9.4-12.3)  fl


 


Neut % (Auto)     (34.0-67.9)  %


 


Lymph % (Auto)     (21.8-53.1)  %


 


Mono % (Auto)     (5.3-12.2)  %


 


Eos % (Auto)     (0.8-7.0)  


 


Baso % (Auto)     (0.1-1.2)  %


 


Neut # (Auto)     (1.78-5.38)  K/mm3


 


Lymph # (Auto)     (1.32-3.57)  K/mm3


 


Mono # (Auto)     (0.30-0.82)  K/mm3


 


Eos # (Auto)     (0.04-0.54)  K/mm3


 


Baso # (Auto)     (0.01-0.08)  K/mm3


 


Manual Slide Review     


 


Sodium     (136-145)  mEq/L


 


Potassium     (3.5-5.1)  mEq/L


 


Chloride     ()  mEq/L


 


Carbon Dioxide     (21-32)  mEq/L


 


Anion Gap     (5-15)  


 


BUN     (7-18)  mg/dL


 


Creatinine     (0.7-1.3)  mg/dL


 


Est Cr Clr Drug Dosing     mL/min


 


Estimated GFR (MDRD)     (>60)  mL/min


 


BUN/Creatinine Ratio     (14-18)  


 


Glucose     ()  mg/dL


 


POC Glucose  131 H  153 H  188 H  ()  mg/dL


 


Calcium     (8.5-10.1)  mg/dL


 


Magnesium     (1.8-2.4)  mg/dl











Med Orders - Current: 


 Current Medications





Acetaminophen (Tylenol)  650 mg PO Q4H PRN


   PRN Reason: Pain (Mild 1-3)/fever


Hydrocodone Bitart/Acetaminophen (Norco 325-5 Mg)  1 - 2 tab PO Q4H PRN


   PRN Reason: Pain


   Last Admin: 11/15/17 13:31 Dose:  2 tab


Albuterol/Ipratropium (Duoneb 3.0-0.5 Mg/3 Ml)  3 ml NEB Q4H PRN


   PRN Reason: Shortness Of Breath/wheezing


Aspirin (Ecotrin)  325 mg PO BID [facility]


   Last Admin: 11/15/17 11:45 Dose:  Not Given


Bisacodyl (Dulcolax)  5 mg PO DAILY PRN


   PRN Reason: Constipation


Cyclobenzaprine HCl (Flexeril)  10 mg PO Q8H PRN


   PRN Reason: Muscle Spasm


   Last Admin: 11/15/17 08:33 Dose:  10 mg


Dextrose/Water (Dextrose 50% In Water)  50 ml IVPUSH ASDIRECTED PRN


   PRN Reason: Hypoglycemia


Docusate Sodium (Colace)  100 mg PO BID PRN


   PRN Reason: Constipation


   Last Admin: 11/15/17 04:15 Dose:  100 mg


Hydromorphone HCl (Dilaudid)  1 mg IVPUSH Q4H PRN


   PRN Reason: Pain (severe 7-10)


   Last Admin: 11/15/17 14:20 Dose:  1 mg


Insulin Aspart (Novolog)  0 unit SUBCUT QIDACANDBED [facility]


   PRN Reason: Protocol


   Last Admin: 11/15/17 17:38 Dose:  1 unit


Insulin Detemir (Levemir)  6 unit SUBCUT BEDTIME [facility]


   Last Admin: 11/14/17 20:26 Dose:  6 units


Insulin Detemir (Levemir)  26 unit SUBCUT QAM [facility]


   Last Admin: 11/15/17 09:57 Dose:  26 units


Levothyroxine Sodium (Levothyroxine)  150 mcg PO DAILY@0600 [facility]


   Last Admin: 11/15/17 06:19 Dose:  150 mcg


Lorazepam (Ativan)  0.5 mg IV Q6H PRN


   PRN Reason: Anxiety


Losartan Potassium (Cozaar)  25 mg PO DAILY [facility]


   Last Admin: 11/15/17 09:57 Dose:  25 mg


Metoprolol Tartrate (Lopressor)  25 mg PO BID [facility]


   Last Admin: 11/15/17 09:52 Dose:  25 mg


Multivitamins (Thera)  1 each PO DAILY [facility]


   Last Admin: 11/15/17 09:52 Dose:  1 each


Ondansetron HCl (Zofran)  4 mg IV Q6H PRN


   PRN Reason: Nausea/Vomiting


Ondansetron HCl (Zofran Odt)  4 mg PO Q4H PRN


   PRN Reason: Nausea/Vomiting


Fluticasone/Salmeterol [Advair 250-50]  0 each INH BID [facility]


   Last Admin: 11/15/17 09:01 Dose:  1 each


Polyethylene Glycol (Miralax)  17 gm PO DAILY PRN


   PRN Reason: Constipation


Rosuvastatin Calcium (Crestor)  10 mg PO BEDTIME [facility]


   Last Admin: 11/14/17 20:08 Dose:  10 mg


Senna/Docusate Sodium (Senna Plus)  1 tab PO BID PRN


   PRN Reason: Constipation


Silver Sulfadiazine (Silvadene 1% Cream 50 Gm)  0 gm TOP TID PRN


   PRN Reason: SKIN COMPLICATIONS


Temazepam (Restoril)  15 mg PO BEDTIME PRN


   PRN Reason: Sleep





Discontinued Medications





Hydrocodone Bitart/Acetaminophen (Norco 325-5 Mg)  1 tab PO Q4H PRN


   PRN Reason: Pain (moderate 4-6)


   Last Admin: 11/14/17 14:47 Dose:  1 tab


Aspirin (Halfprin)  162 mg PO DAILY ODALIS


Aspirin (Halfprin)  162 mg PO ONETIME ONE


   Stop: 11/15/17 11:46


   Last Admin: 11/15/17 11:38 Dose:  162 mg


Bupivacaine HCl (Marcaine 0.25%) Confirm Administered Dose 30 ml .ROUTE .STK-

MED ONE


   Stop: 11/14/17 10:25


   Last Admin: 11/14/17 12:22 Dose:  20 ml


Bupivacaine HCl (Marcaine 0.25%) Confirm Administered Dose 30 ml .ROUTE .STK-

MED ONE


   Stop: 11/14/17 11:24


Cefazolin Sodium (Ancef) Confirm Administered Dose 2 gm .ROUTE .STK-MED ONE


   Stop: 11/14/17 10:44


Ephedrine Sulfate (Ephedrine Sulfate) Confirm Administered Dose 50 mg .ROUTE 

.STK-MED ONE


   Stop: 11/14/17 11:59


Fentanyl (Sublimaze) Confirm Administered Dose 100 mcg .ROUTE .STK-MED ONE


   Stop: 11/14/17 10:44


Fentanyl (Sublimaze)  50 mcg IVPUSH Q5M PRN


   PRN Reason: PAIN


   Stop: 11/14/17 16:00


Hydromorphone HCl (Dilaudid)  0.5 mg IVPUSH NOW STA


   Stop: 11/13/17 17:59


   Last Admin: 11/13/17 18:01 Dose:  0.5 mg


Hydromorphone HCl (Dilaudid)  1 mg IVPUSH ONETIME ONE


   Stop: 11/13/17 18:32


   Last Admin: 11/13/17 18:36 Dose:  1 mg


Sodium Chloride (Normal Saline)  1,000 mls @ 150 mls/hr IV ASDIRECTED ODALIS


   Last Admin: 11/14/17 09:11 Dose:  150 mls/hr


Promethazine HCl 12.5 mg/ (Sodium Chloride)  50.5 mls @ 100 mls/hr IV Q6H PRN


   PRN Reason: Nausea/Vomiting


Lidocaine HCl (Xylocaine-Mpf 1%) Confirm Administered Dose 4 mls @ as directed 

.ROUTE .STK-MED ONE


   Stop: 11/14/17 10:44


Magnesium Sulfate 2 gm/ Premix  50 mls @ 25 mls/hr IV ONETIME ONE


   Stop: 11/14/17 18:59


   Last Admin: 11/14/17 16:48 Dose:  Not Given


Lactated Ringer's (Ringers, Lactated) Confirm Administered Dose 1,000 mls @ as 

directed .ROUTE .STK-MED ONE


   Stop: 11/14/17 12:00


Lactated Ringer's (Ringers, Lactated)  500 mls @ 1,000 mls/hr IV .BOLUS ONE


   Stop: 11/14/17 13:14


   Last Infusion: 11/14/17 13:02 Dose:  Infused


Magnesium Oxide (Magnesium Oxide)  800 mg PO ONETIME ONE


   Stop: 11/14/17 09:01


   Last Admin: 11/14/17 09:17 Dose:  Not Given


Midazolam HCl (Versed 1 Mg/Ml) Confirm Administered Dose 2 mg .ROUTE .STK-MED 

ONE


   Stop: 11/14/17 10:44


Non-Formulary Medication (Acetaminophen/Diphenhydramine)  1 tab PO BEDTIME PRN


   PRN Reason: Insomnia


Non-Formulary Medication (Melatonin [Melatonin])  10 mg PO BEDTIME ODALIS


Non-Formulary Medication (Triamcinolone Acetonide [Kenalog-10])  1 applic TOP 

BID PRN


   PRN Reason: skin complications


Non-Formulary Medication (Ubidecarenone)  1 tab PO DAILY ODALIS


Ondansetron HCl (Zofran) Confirm Administered Dose 4 mg .ROUTE .STK-MED ONE


   Stop: 11/14/17 10:44


Phenylephrine HCl (Shai-Synephrine) Confirm Administered Dose 10 mg .ROUTE .STK-

MED ONE


   Stop: 11/14/17 11:33


Propofol (Diprivan  20 Ml) Confirm Administered Dose 200 mg .ROUTE .STK-MED ONE


   Stop: 11/14/17 10:44


Propofol (Diprivan  20 Ml) Confirm Administered Dose 200 mg .ROUTE .STK-MED ONE


   Stop: 11/14/17 12:03


Propofol (Diprivan  20 Ml) Confirm Administered Dose 200 mg .ROUTE .STK-MED ONE


   Stop: 11/14/17 12:26


Rosuvastatin Calcium (Crestor)  10 mg PO DAILY [facility]


Silver Sulfadiazine (Silvadene 1% Cream 400 Gm)  0 gm TOP TID PRN


   PRN Reason: SKIN COMPLICATIONS











- Exam


Wound/Incisions: Other (Mod shadowing on dressings.  No acute bleeding noted.)


General: Alert, Cooperative, No Acute Distress


Lungs: Normal Respiratory Effort


Extremities: Other (NVS intact for RLE.  Right thigh soft.  Melvin's negative 

for RLE.)





- Problem List Review


Problem List Initiated/Reviewed/Updated: Yes





- My Orders


Last 24 Hours: 


 Active Orders 24 hr











 Category Date Time Status


 


 BASIC METABOLIC PANEL,BMP [CHEM] AM Lab  11/16/17 05:11 Ordered


 


 CBC WITH AUTO DIFF [HEME] AM Lab  11/16/17 05:11 Ordered


 


 MAGNESIUM [CHEM] AM Lab  11/16/17 05:11 Ordered


 


 Aspirin [Ecotrin] Med  11/15/17 10:15 Active





 325 mg PO BID   


 


 Cyclobenzaprine [Flexeril] Med  11/15/17 08:06 Active





 10 mg PO Q8H PRN   


 


 Insulin Detemir [Levemir] Med  11/14/17 21:00 Active





 6 unit SUBCUT BEDTIME   


 


 Rosuvastatin [Crestor] Med  11/14/17 21:00 Active





 10 mg PO BEDTIME   


 


 Weight bearing status [OM.PC] Routine Oth  11/15/17 08:04 Ordered








 Medication Orders





Acetaminophen (Tylenol)  650 mg PO Q4H PRN


   PRN Reason: Pain (Mild 1-3)/fever


Hydrocodone Bitart/Acetaminophen (Norco 325-5 Mg)  1 - 2 tab PO Q4H PRN


   PRN Reason: Pain


   Last Admin: 11/15/17 13:31  Dose: 2 tab


   Admin: 11/15/17 08:33  Dose: 2 tab


   Admin: 11/15/17 04:13  Dose: 2 tab


   Admin: 11/15/17 00:30  Dose: 2 tab


   Admin: 11/14/17 20:09  Dose: 2 tab


   Admin: 11/14/17 15:48  Dose: 1 tab


Albuterol/Ipratropium (Duoneb 3.0-0.5 Mg/3 Ml)  3 ml NEB Q4H PRN


   PRN Reason: Shortness Of Breath/wheezing


Aspirin (Ecotrin)  325 mg PO BID ODALIS


   Last Admin: 11/15/17 11:45  Dose:  


Bisacodyl (Dulcolax)  5 mg PO DAILY PRN


   PRN Reason: Constipation


Cyclobenzaprine HCl (Flexeril)  10 mg PO Q8H PRN


   PRN Reason: Muscle Spasm


   Last Admin: 11/15/17 08:33  Dose: 10 mg


Dextrose/Water (Dextrose 50% In Water)  50 ml IVPUSH ASDIRECTED PRN


   PRN Reason: Hypoglycemia


Docusate Sodium (Colace)  100 mg PO BID PRN


   PRN Reason: Constipation


   Last Admin: 11/15/17 04:15  Dose: 100 mg


Hydromorphone HCl (Dilaudid)  1 mg IVPUSH Q4H PRN


   PRN Reason: Pain (severe 7-10)


   Last Admin: 11/15/17 14:20  Dose: 1 mg


   Admin: 11/15/17 09:58  Dose: 1 mg


   Admin: 11/14/17 15:40  Dose: 1 mg


   Admin: 11/14/17 08:46  Dose: 1 mg


Insulin Aspart (Novolog)  0 unit SUBCUT QIDACANDBED [facility]


   PRN Reason: Protocol


   Last Admin: 11/15/17 17:38  Dose: 1 unit


   Admin: 11/15/17 12:31  Dose: 1 unit


   Admin: 11/15/17 06:12  Dose:  


   Admin: 11/14/17 21:07  Dose: 1 unit


   Admin: 11/14/17 17:33  Dose: 1 unit


   Admin: 11/14/17 12:10  Dose:  


   Admin: 11/14/17 06:33  Dose:  


Insulin Detemir (Levemir)  6 unit SUBCUT BEDTIME [facility]


   Last Admin: 11/14/17 20:26  Dose: 6 units


Insulin Detemir (Levemir)  26 unit SUBCUT QAM [facility]


   Last Admin: 11/15/17 09:57  Dose: 26 units


   Admin: 11/14/17 08:38  Dose: 26 units


Levothyroxine Sodium (Levothyroxine)  150 mcg PO DAILY@0600 [facility]


   Last Admin: 11/15/17 06:19  Dose: 150 mcg


   Admin: 11/14/17 06:12  Dose: 150 mcg


Lorazepam (Ativan)  0.5 mg IV Q6H PRN


   PRN Reason: Anxiety


Losartan Potassium (Cozaar)  25 mg PO DAILY [facility]


   Last Admin: 11/15/17 09:57  Dose: 25 mg


   Admin: 11/14/17 09:17  Dose:  


Metoprolol Tartrate (Lopressor)  25 mg PO BID [facility]


   Last Admin: 11/15/17 09:52  Dose: 25 mg


   Admin: 11/14/17 20:08  Dose: 25 mg


   Admin: 11/14/17 08:44  Dose: 25 mg


Multivitamins (Thera)  1 each PO DAILY [facility]


   Last Admin: 11/15/17 09:52  Dose: 1 each


   Admin: 11/14/17 09:18  Dose:  


Ondansetron HCl (Zofran)  4 mg IV Q6H PRN


   PRN Reason: Nausea/Vomiting


Ondansetron HCl (Zofran Odt)  4 mg PO Q4H PRN


   PRN Reason: Nausea/Vomiting


Fluticasone/Salmeterol [Advair 250-50]  0 each INH BID [facility]


   Last Admin: 11/15/17 09:01  Dose: 1 each


   Admin: 11/14/17 20:40  Dose: 1 each


   Admin: 11/14/17 08:04  Dose: 1 each


Polyethylene Glycol (Miralax)  17 gm PO DAILY PRN


   PRN Reason: Constipation


Rosuvastatin Calcium (Crestor)  10 mg PO BEDTIME [facility]


   Last Admin: 11/14/17 20:08  Dose: 10 mg


Senna/Docusate Sodium (Senna Plus)  1 tab PO BID PRN


   PRN Reason: Constipation


Silver Sulfadiazine (Silvadene 1% Cream 50 Gm)  0 gm TOP TID PRN


   PRN Reason: SKIN COMPLICATIONS


Temazepam (Restoril)  15 mg PO BEDTIME PRN


   PRN Reason: Sleep











- Assessment


Assessment           (Free Text/Narrative):: 





POD#1 - cephalomedullary nailing right hip for intertrochanteric fracture





- Plan


Plan                        (Free Text/Narrative):: 





1.  ASA 325mg BID.  TEDs, SCDs.


2.  Likely discharge to home tomorrow.  


3.  Continue with therapy.





The pt's case was discussed with Dr. Berry.

## 2017-11-15 NOTE — CT
CT chest

 

Technique: Multiple axial sections through the chest were obtained.  

Intravenous contrast was not utilized.

 

Comparison: Prior chest x-ray of 11/13/17.

 

Findings: Slight pleural thickening is seen within both lung bases.  

Surgical clips are seen at the base of the neck.  Previous sternotomy 

is noted.  Mediastinum and hilar regions show no adenopathy.  

Atherosclerotic change is noted within the aorta.  Ascending thoracic 

aorta is slightly aneurysmal at 4.6 cm in AP dimension.  Descending 

thoracic aorta measures normal at 2.8 cm.  Lungs show no nodule as 

suggested on chest x-ray.  Chest x-ray finding most likely due to 

result atelectasis.  No acute appearing pulmonary densities are seen.

 

Bone window settings were reviewed showing scattered degenerative 

change throughout the spine with mild scoliosis.

 

Impression:

1.  Ascending thoracic aneurysm measuring 4.6 cm in AP dimension.

2.  Mild pleural thickening within both lung bases.

3.  No parenchymal nodule is seen as suggested on recent chest x-ray. 

 Chest x-ray finding most likely due to atelectasis that has resolved.

 

Diagnostic code #3

## 2017-11-15 NOTE — PCM.PN
- General Info


Date of Service: 11/15/17


Admission Dx/Problem (Free Text): 


 Admission Diagnosis/Problem





Admission Diagnosis/Problem      Intertrochanteric fracture, hip





POD #1 ORIF rt hip fx. Review intraoperative images with patient and wife this 

morning. 


VSS.


Working with therapies; moving a little better this morning


Pain under fair control- ok at rest still "10/10" with any movement.


Hgb 11.9 (13.6 preop), no CP, SOB, palpitations, dizziness. 





Functional Status: Reports: Tolerating Diet, Ambulating, Urinating, Incentive 

Spirometry.  Denies: Pain Controlled (but is improved today), New Symptoms





- Review of Systems


General: Reports: No Symptoms


HEENT: Reports: No Symptoms


Pulmonary: Reports: No Symptoms.  Denies: Shortness of Breath


Cardiovascular: Reports: No Symptoms.  Denies: Chest Pain, Palpitations, 

Dyspnea on Exertion, Lightheadedness


Gastrointestinal: Reports: No Symptoms.  Denies: Abdominal Pain, Nausea, 

Vomiting


Genitourinary: Reports: No Symptoms


Musculoskeletal: Reports: Leg Pain


Skin: Reports: No Symptoms


Neurological: Reports: No Symptoms


Psychiatric: Reports: No Symptoms





- Patient Data


Vitals - Most Recent: 


 Last Vital Signs











Temp  98.8 F   11/15/17 03:54


 


Pulse  79   11/15/17 03:54


 


Resp  18   11/15/17 03:54


 


BP  111/72   11/15/17 03:54


 


Pulse Ox  97   11/15/17 03:54











Weight - Most Recent: 223 lb 9.6 oz


I&O - Last 24 Hours: 


 Intake & Output











 11/14/17 11/14/17 11/15/17





 14:59 22:59 06:59


 


Intake Total 


 


Output Total 


 


Balance 170 -90 655











Lab Results Last 24 Hours: 


 Laboratory Results - last 24 hr











  11/14/17 11/14/17 11/14/17 Range/Units





  06:12 06:12 06:52 


 


WBC  5.27    (4.23-9.07)  K/mm3


 


RBC  4.20 L    (4.63-6.08)  M/mm3


 


Hgb  13.6 L    (13.7-17.5)  gm/L


 


Hct  40.4    (40.1-51.0)  %


 


MCV  96.2 H    (79.0-92.2)  fl


 


MCH  32.4 H    (25.7-32.2)  pg


 


MCHC  33.7    (32.2-35.5)  g/dl


 


RDW Std Deviation  42.0    (35.1-43.9)  fL


 


Plt Count  121 L    (163-337)  K/mm3


 


MPV  10.4    (9.4-12.3)  fl


 


Neut % (Auto)  54.8    (34.0-67.9)  %


 


Lymph % (Auto)  22.2    (21.8-53.1)  %


 


Mono % (Auto)  16.9 H    (5.3-12.2)  %


 


Eos % (Auto)  5.1    (0.8-7.0)  


 


Baso % (Auto)  0.6    (0.1-1.2)  %


 


Neut # (Auto)  2.89    (1.78-5.38)  K/mm3


 


Lymph # (Auto)  1.17 L    (1.32-3.57)  K/mm3


 


Mono # (Auto)  0.89 H    (0.30-0.82)  K/mm3


 


Eos # (Auto)  0.27    (0.04-0.54)  K/mm3


 


Baso # (Auto)  0.03    (0.01-0.08)  K/mm3


 


Manual Slide Review  Normal smear    


 


Sodium   141   (136-145)  mEq/L


 


Potassium   4.0   (3.5-5.1)  mEq/L


 


Chloride   106   ()  mEq/L


 


Carbon Dioxide   25   (21-32)  mEq/L


 


Anion Gap   14.0   (5-15)  


 


BUN   20 H   (7-18)  mg/dL


 


Creatinine   1.1   (0.7-1.3)  mg/dL


 


Est Cr Clr Drug Dosing   66.55   mL/min


 


Estimated GFR (MDRD)   > 60   (>60)  mL/min


 


BUN/Creatinine Ratio   18.2 H   (14-18)  


 


Glucose   158 H   ()  mg/dL


 


POC Glucose     ()  mg/dL


 


Hemoglobin A1c     (4.50-6.20)  %


 


Calcium   7.8 L   (8.5-10.1)  mg/dL


 


Magnesium   1.6 L   (1.8-2.4)  mg/dl


 


TSH 3rd Generation    2.518  (0.358-3.74)  uIU/mL


 


MRSA (PCR)     














  11/14/17 11/14/17 11/14/17 Range/Units





  06:52 07:04 14:28 


 


WBC     (4.23-9.07)  K/mm3


 


RBC     (4.63-6.08)  M/mm3


 


Hgb     (13.7-17.5)  gm/L


 


Hct     (40.1-51.0)  %


 


MCV     (79.0-92.2)  fl


 


MCH     (25.7-32.2)  pg


 


MCHC     (32.2-35.5)  g/dl


 


RDW Std Deviation     (35.1-43.9)  fL


 


Plt Count     (163-337)  K/mm3


 


MPV     (9.4-12.3)  fl


 


Neut % (Auto)     (34.0-67.9)  %


 


Lymph % (Auto)     (21.8-53.1)  %


 


Mono % (Auto)     (5.3-12.2)  %


 


Eos % (Auto)     (0.8-7.0)  


 


Baso % (Auto)     (0.1-1.2)  %


 


Neut # (Auto)     (1.78-5.38)  K/mm3


 


Lymph # (Auto)     (1.32-3.57)  K/mm3


 


Mono # (Auto)     (0.30-0.82)  K/mm3


 


Eos # (Auto)     (0.04-0.54)  K/mm3


 


Baso # (Auto)     (0.01-0.08)  K/mm3


 


Manual Slide Review     


 


Sodium     (136-145)  mEq/L


 


Potassium     (3.5-5.1)  mEq/L


 


Chloride     ()  mEq/L


 


Carbon Dioxide     (21-32)  mEq/L


 


Anion Gap     (5-15)  


 


BUN     (7-18)  mg/dL


 


Creatinine     (0.7-1.3)  mg/dL


 


Est Cr Clr Drug Dosing     mL/min


 


Estimated GFR (MDRD)     (>60)  mL/min


 


BUN/Creatinine Ratio     (14-18)  


 


Glucose     ()  mg/dL


 


POC Glucose    144 H  ()  mg/dL


 


Hemoglobin A1c  6.80 H    (4.50-6.20)  %


 


Calcium     (8.5-10.1)  mg/dL


 


Magnesium     (1.8-2.4)  mg/dl


 


TSH 3rd Generation     (0.358-3.74)  uIU/mL


 


MRSA (PCR)   Negative   














  11/14/17 11/14/17 11/15/17 Range/Units





  16:58 20:25 06:04 


 


WBC     (4.23-9.07)  K/mm3


 


RBC     (4.63-6.08)  M/mm3


 


Hgb     (13.7-17.5)  gm/L


 


Hct     (40.1-51.0)  %


 


MCV     (79.0-92.2)  fl


 


MCH     (25.7-32.2)  pg


 


MCHC     (32.2-35.5)  g/dl


 


RDW Std Deviation     (35.1-43.9)  fL


 


Plt Count     (163-337)  K/mm3


 


MPV     (9.4-12.3)  fl


 


Neut % (Auto)     (34.0-67.9)  %


 


Lymph % (Auto)     (21.8-53.1)  %


 


Mono % (Auto)     (5.3-12.2)  %


 


Eos % (Auto)     (0.8-7.0)  


 


Baso % (Auto)     (0.1-1.2)  %


 


Neut # (Auto)     (1.78-5.38)  K/mm3


 


Lymph # (Auto)     (1.32-3.57)  K/mm3


 


Mono # (Auto)     (0.30-0.82)  K/mm3


 


Eos # (Auto)     (0.04-0.54)  K/mm3


 


Baso # (Auto)     (0.01-0.08)  K/mm3


 


Manual Slide Review     


 


Sodium     (136-145)  mEq/L


 


Potassium     (3.5-5.1)  mEq/L


 


Chloride     ()  mEq/L


 


Carbon Dioxide     (21-32)  mEq/L


 


Anion Gap     (5-15)  


 


BUN     (7-18)  mg/dL


 


Creatinine     (0.7-1.3)  mg/dL


 


Est Cr Clr Drug Dosing     mL/min


 


Estimated GFR (MDRD)     (>60)  mL/min


 


BUN/Creatinine Ratio     (14-18)  


 


Glucose     ()  mg/dL


 


POC Glucose  162 H  177 H  131 H  ()  mg/dL


 


Hemoglobin A1c     (4.50-6.20)  %


 


Calcium     (8.5-10.1)  mg/dL


 


Magnesium     (1.8-2.4)  mg/dl


 


TSH 3rd Generation     (0.358-3.74)  uIU/mL


 


MRSA (PCR)     











Med Orders - Current: 


 Current Medications





Acetaminophen (Tylenol)  650 mg PO Q4H PRN


   PRN Reason: Pain (Mild 1-3)/fever


Hydrocodone Bitart/Acetaminophen (Norco 325-5 Mg)  1 - 2 tab PO Q4H PRN


   PRN Reason: Pain


   Last Admin: 11/15/17 04:13 Dose:  2 tab


Albuterol/Ipratropium (Duoneb 3.0-0.5 Mg/3 Ml)  3 ml NEB Q4H PRN


   PRN Reason: Shortness Of Breath/wheezing


Aspirin (Halfprin)  162 mg PO DAILY [facility]


Bisacodyl (Dulcolax)  5 mg PO DAILY PRN


   PRN Reason: Constipation


Dextrose/Water (Dextrose 50% In Water)  50 ml IVPUSH ASDIRECTED PRN


   PRN Reason: Hypoglycemia


Docusate Sodium (Colace)  100 mg PO BID PRN


   PRN Reason: Constipation


   Last Admin: 11/15/17 04:15 Dose:  100 mg


Hydromorphone HCl (Dilaudid)  1 mg IVPUSH Q4H PRN


   PRN Reason: Pain (severe 7-10)


   Last Admin: 11/14/17 15:40 Dose:  1 mg


Insulin Aspart (Novolog)  0 unit SUBCUT QIDACANDBED [facility]


   PRN Reason: Protocol


   Last Admin: 11/15/17 06:12 Dose:  Not Given


Insulin Detemir (Levemir)  6 unit SUBCUT BEDTIME [facility]


   Last Admin: 11/14/17 20:26 Dose:  6 units


Insulin Detemir (Levemir)  26 unit SUBCUT QAM [facility]


   Last Admin: 11/14/17 08:38 Dose:  26 units


Levothyroxine Sodium (Levothyroxine)  150 mcg PO DAILY@0600 [facility]


   Last Admin: 11/15/17 06:19 Dose:  150 mcg


Lorazepam (Ativan)  0.5 mg IV Q6H PRN


   PRN Reason: Anxiety


Losartan Potassium (Cozaar)  25 mg PO DAILY [facility]


   Last Admin: 11/14/17 09:17 Dose:  Not Given


Metoprolol Tartrate (Lopressor)  25 mg PO BID [facility]


   Last Admin: 11/14/17 20:08 Dose:  25 mg


Multivitamins (Thera)  1 each PO DAILY [facility]


   Last Admin: 11/14/17 09:18 Dose:  Not Given


Ondansetron HCl (Zofran)  4 mg IV Q6H PRN


   PRN Reason: Nausea/Vomiting


Ondansetron HCl (Zofran Odt)  4 mg PO Q4H PRN


   PRN Reason: Nausea/Vomiting


Fluticasone/Salmeterol [Advair 250-50]  0 each INH BID [facility]


   Last Admin: 11/14/17 20:40 Dose:  1 each


Polyethylene Glycol (Miralax)  17 gm PO DAILY PRN


   PRN Reason: Constipation


Rosuvastatin Calcium (Crestor)  10 mg PO BEDTIME [facility]


   Last Admin: 11/14/17 20:08 Dose:  10 mg


Senna/Docusate Sodium (Senna Plus)  1 tab PO BID PRN


   PRN Reason: Constipation


Silver Sulfadiazine (Silvadene 1% Cream 50 Gm)  0 gm TOP TID PRN


   PRN Reason: SKIN COMPLICATIONS


Temazepam (Restoril)  15 mg PO BEDTIME PRN


   PRN Reason: Sleep





Discontinued Medications





Hydrocodone Bitart/Acetaminophen (Norco 325-5 Mg)  1 tab PO Q4H PRN


   PRN Reason: Pain (moderate 4-6)


   Last Admin: 11/14/17 14:47 Dose:  1 tab


Bupivacaine HCl (Marcaine 0.25%) Confirm Administered Dose 30 ml .ROUTE .STK-

MED ONE


   Stop: 11/14/17 10:25


   Last Admin: 11/14/17 12:22 Dose:  20 ml


Bupivacaine HCl (Marcaine 0.25%) Confirm Administered Dose 30 ml .ROUTE .STK-

MED ONE


   Stop: 11/14/17 11:24


Cefazolin Sodium (Ancef) Confirm Administered Dose 2 gm .ROUTE .STK-MED ONE


   Stop: 11/14/17 10:44


Ephedrine Sulfate (Ephedrine Sulfate) Confirm Administered Dose 50 mg .ROUTE 

.STK-MED ONE


   Stop: 11/14/17 11:59


Fentanyl (Sublimaze) Confirm Administered Dose 100 mcg .ROUTE .STK-MED ONE


   Stop: 11/14/17 10:44


Fentanyl (Sublimaze)  50 mcg IVPUSH Q5M PRN


   PRN Reason: PAIN


   Stop: 11/14/17 16:00


Hydromorphone HCl (Dilaudid)  0.5 mg IVPUSH NOW STA


   Stop: 11/13/17 17:59


   Last Admin: 11/13/17 18:01 Dose:  0.5 mg


Hydromorphone HCl (Dilaudid)  1 mg IVPUSH ONETIME ONE


   Stop: 11/13/17 18:32


   Last Admin: 11/13/17 18:36 Dose:  1 mg


Sodium Chloride (Normal Saline)  1,000 mls @ 150 mls/hr IV ASDIRECTED [facility]


   Last Admin: 11/14/17 09:11 Dose:  150 mls/hr


Promethazine HCl 12.5 mg/ (Sodium Chloride)  50.5 mls @ 100 mls/hr IV Q6H PRN


   PRN Reason: Nausea/Vomiting


Lidocaine HCl (Xylocaine-Mpf 1%) Confirm Administered Dose 4 mls @ as directed 

.ROUTE .STK-MED ONE


   Stop: 11/14/17 10:44


Magnesium Sulfate 2 gm/ Premix  50 mls @ 25 mls/hr IV ONETIME ONE


   Stop: 11/14/17 18:59


   Last Admin: 11/14/17 16:48 Dose:  Not Given


Lactated Ringer's (Ringers, Lactated) Confirm Administered Dose 1,000 mls @ as 

directed .ROUTE .STK-MED ONE


   Stop: 11/14/17 12:00


Lactated Ringer's (Ringers, Lactated)  500 mls @ 1,000 mls/hr IV .BOLUS ONE


   Stop: 11/14/17 13:14


   Last Infusion: 11/14/17 13:02 Dose:  Infused


Magnesium Oxide (Magnesium Oxide)  800 mg PO ONETIME ONE


   Stop: 11/14/17 09:01


   Last Admin: 11/14/17 09:17 Dose:  Not Given


Midazolam HCl (Versed 1 Mg/Ml) Confirm Administered Dose 2 mg .ROUTE .STK-MED 

ONE


   Stop: 11/14/17 10:44


Non-Formulary Medication (Acetaminophen/Diphenhydramine)  1 tab PO BEDTIME PRN


   PRN Reason: Insomnia


Non-Formulary Medication (Melatonin [Melatonin])  10 mg PO BEDTIME [facility]


Non-Formulary Medication (Triamcinolone Acetonide [Kenalog-10])  1 applic TOP 

BID PRN


   PRN Reason: skin complications


Non-Formulary Medication (Ubidecarenone)  1 tab PO DAILY [facility]


Ondansetron HCl (Zofran) Confirm Administered Dose 4 mg .ROUTE .STK-MED ONE


   Stop: 11/14/17 10:44


Phenylephrine HCl (Shai-Synephrine) Confirm Administered Dose 10 mg .ROUTE .STK-

MED ONE


   Stop: 11/14/17 11:33


Propofol (Diprivan  20 Ml) Confirm Administered Dose 200 mg .ROUTE .STK-MED ONE


   Stop: 11/14/17 10:44


Propofol (Diprivan  20 Ml) Confirm Administered Dose 200 mg .ROUTE .STK-MED ONE


   Stop: 11/14/17 12:03


Propofol (Diprivan  20 Ml) Confirm Administered Dose 200 mg .ROUTE .STK-MED ONE


   Stop: 11/14/17 12:26


Rosuvastatin Calcium (Crestor)  10 mg PO DAILY [facility]


Silver Sulfadiazine (Silvadene 1% Cream 400 Gm)  0 gm TOP TID PRN


   PRN Reason: SKIN COMPLICATIONS











- Exam


Quality Assessment: Supplemental Oxygen


General: Alert, Oriented, No Acute Distress


HEENT: Pupils Equal, EOMI, Mucous Membr. Moist/Pink


Neck: Supple


Lungs: Clear to Auscultation, Normal Respiratory Effort


Cardiovascular: Regular Rate, Regular Rhythm


GI/Abdominal Exam: Normal Bowel Sounds, Soft, Non-Tender


 (Male) Exam: Deferred


Extremities: No Pedal Edema, Normal Capillary Refill, Other (Teds/SCD's and ice 

to rt hip)


Peripheral Pulses: 2+: Dorsalis Pedis (L), Dorsalis Pedis (R)


Neurological: No New Focal Deficit


Psy/Mental Status: Alert, Normal Affect, Normal Mood





- Problem List & Annotations


(1) Intertrochanteric fracture, hip


SNOMED Code(s): 631306505


   Code(s): S72.143A - DISPLACED INTERTROCHANTERIC FRACTURE OF UNSP FEMUR, INIT

   Status: Acute   Priority: High   Current Visit: Yes   





(2) Fall at home


SNOMED Code(s): 28432204


   Code(s): W19.XXXA - UNSPECIFIED FALL, INITIAL ENCOUNTER; Y92.099 - UNSP 

PLACE IN OTH NON-INSTITUTIONAL RESIDENCE AS PLACE   Status: Acute   Priority: 

High   Current Visit: Yes   


Qualifiers: 


   Encounter type: initial encounter   Qualified Code(s): W19.XXXA - 

Unspecified fall, initial encounter; Y92.099 - Unspecified place in other non-

institutional residence as the place of occurrence of the external cause; 

Y92.099 - Unspecified place in other non-institutional residence as the place 

of occurrence of the external cause   





(3) CAD (coronary artery disease)


SNOMED Code(s): 12937612


   Code(s): I25.10 - ATHSCL HEART DISEASE OF NATIVE CORONARY ARTERY W/O ANG 

PCTRS   Status: Chronic   Priority: Medium   Current Visit: Yes   


Qualifiers: 


   Coronary Disease-Associated Artery/Lesion type: unspecified vessel or lesion 

type   Native vs. transplanted heart: native heart   Associated angina: without 

angina   Qualified Code(s): I25.10 - Atherosclerotic heart disease of native 

coronary artery without angina pectoris   





(4) HTN (hypertension)


SNOMED Code(s): 07850968


   Code(s): I10 - ESSENTIAL (PRIMARY) HYPERTENSION   Status: Chronic   Priority

: Medium   Current Visit: Yes   


Qualifiers: 


   Hypertension type: essential hypertension   Qualified Code(s): I10 - 

Essential (primary) hypertension   





(5) HLD (hyperlipidemia)


SNOMED Code(s): 10534457


   Code(s): E78.5 - HYPERLIPIDEMIA, UNSPECIFIED   Status: Chronic   Priority: 

Low   Current Visit: Yes   


Qualifiers: 


   Hyperlipidemia type: unspecified   Qualified Code(s): E78.5 - Hyperlipidemia

, unspecified   





- Problem List Review


Problem List Initiated/Reviewed/Updated: Yes





- My Orders


Last 24 Hours: 


My Active Orders





11/14/17 07:13


Consult to Dietician [CONS] Routine 





11/14/17 07:15


Ondansetron [Zofran ODT]   4 mg PO Q4H PRN 





11/14/17 07:16


RT Incentive Spirometry [RC] Q2HWA 


Turn, Cough, Deep Breathe [RC] Q2HWA 





11/15/17 07:12


Chest wo Cont [CT] Routine 














- Plan


Plan:: 


Assessment/Plan:


Acute:


Right Inter-Trochanteric Fracture--S/P ORIF, POD #1


   - 2/2 Mechanical Fall at home


   - Fall Precautions


   - Ortho consulted- Dr. Berry 


   - Pain control--is better today but still reports "10/10" when moving 


      Ortho to assume pain management and DVT prophylax s/p ORIF





         Pre-Operative Risk Stratification completed per Dr. Ledesma as noted 

below: 


         - Risk factors: CAD S/p 5 Vessel CABG in 1998, HLD, HTN, Hx/o MI in 

1997, TSERING, Restrictive Lung Disease, DM2, Chronic                              

                          Thrombocytopenia and Hyperkalemia  


         - METS <4, SVS


         - EKG/CXR: benign; - Physical exam fairly benign


         - No recent cardiac or lung eval---Does Follow with Cardiology and 

Pulmonology at [facility]


         - Has significant/advanced combined cardiac and lung disease based on 

medical hx


         - Based on the data provided, the patient carries a very high cardiac 

risk for intermediate surgical risk (relayed to on-call CRNA)


         - Patient and family understood his medical/surgical risks and the 

resources we have here in [place] 


         - Patient and wife wish to proceed with surgery here despite above 

noted discussion and known lack of Cardiology services 


   -RT/IS/C&DB Q2H while awake


   -Hgb stable at 14.4-->13.6 preoperatively--> 11.9


 





Chronic:  Resume Home medications postoperatively:


CAD S/p 5 Vessel CABG in 1998, AMI in 1997


HTN- stable and controlled


TSERING


Restrictive Lung Disease- Follows Pulmonology


Mediastinal and Hilar Lymphadenopathy


Hx/o Hernia s.p Repair within the last 4 months, tolerated surgery well


CVA with mild Right Sided Residual Deficits/weakness


DM2--A1C good at 6.8; sugar 131 this am


Hx/o Hyperthyroidism 2/2 Thyroid Mass---recent thyroidectomy within the last 6+ 

months--TSH good at 2.518


Thrombocytopenia- chronic and stable- normal WBC,  and hgb 11.9 today


Hyperkalemia- therapeutic and stable currently


Seborrheic Dermatitis


Visual loss


ED





Plan:


Admit to Med-Surg w/ Tele


Routine AM labs


Resume Homed Meds


PT/OT consult postoperatively


IS q2 awake after surgery


H2B for GI ppx


Ortho consult- Dr. Berry


DVT ppx: SCDs for now; DVT prophylax per Ortho after ORIF 


CM/SW for assist with DC planning: Cont current orders and POC today; plan for 

likely DC home with wife tomorrow if stable and continues to do well. 





Code status: DNR/DNI





PCP is Dr. Rody Wallis, Cardiology follows Dolores Banks PA-C with [facility]

, Pulmonology is Dr. Allred.

## 2017-11-16 NOTE — PCM.DCSUM1
**Discharge Summary





- Hospital Course


Free Text/Narrative:: 





This is a 73 yo elderly white male with past medical hx/o Impaired Vision, AR, 

CAD S/p 5 Vessel CABG in 1998, HD, HTN, MI in 1997, TSERING not on CPAP, 

Restrictive Lung Disease, Hx/o Mediastinal and Hilar Lymphadenopathy, Hx/o 

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 

Hypothyroidism S/p Thyroidectomy, Chronic Thrombocytopenia, Hyperkalemia and 

Seborrheic Dermatitis who comes in for an evaluation of hip pain after 

sustaining a mechanical fall that took place in his kitchen at home. Patient 

reports no prodromal symptoms. He admits to having bad socks that caused him to 

lose his balance, twist and fall.





Patient carries a baseline right sided residual deficits form his hx/o Multiple 

CVA. His initial work up in ED, shows a CBC that is remarkable for RBC of 4.47, 

MCV of 94.4, platelet of 1:30, monocytes of 12.6%, and eosinophils of 7.4%. His 

PT is 10.9, INR of 1, APTT of 27. his chemistry is remarkable for BUN of 22, 

creatinine of 1.2, glucose of 156, and calcium of 8.3. Chest x-ray shows no 

acute abnormal findings but his hip x-ray shows right inter-trochanteric 

fracture. 





Patient is being admitted for acute right hip fracture. He is DNR/DNI.





He was taken to OR by Dr. Berry, ORIF of right hip. Postoperatively he did well, 

no complications. Hgb maintained, other labs stable/WNL. VSS. He was working 

with PT/OT with slow progress. Recommendations were for SNF placement for rehab 

stay, patient and wife are in agreement. He will be DC'd to [facility] today for 

rehab stay. F/up with PCP, Dr. Wallis within one week and with Orthopedics as 

scheduled. 








- Discharge Data


Discharge Date: 11/16/17 (admit date11/14/17)


Discharge Disposition: DC/Tfer to SNF 03


Condition: Good





- Discharge Diagnosis/Problem(s)


(1) Intertrochanteric fracture, hip


SNOMED Code(s): 076283669


   ICD Code: S72.143A - DISPLACED INTERTROCHANTERIC FRACTURE OF UNSP FEMUR, 

INIT   Status: Acute   Priority: High   Current Visit: Yes   





(2) Fall at home


SNOMED Code(s): 48895130


   ICD Code: W19.XXXA - UNSPECIFIED FALL, INITIAL ENCOUNTER; Y92.099 - UNSP 

PLACE IN OTH NON-INSTITUTIONAL RESIDENCE AS PLACE   Status: Acute   Priority: 

High   Current Visit: Yes   


Qualifiers: 


   Encounter type: initial encounter   Qualified Code(s): W19.XXXA - 

Unspecified fall, initial encounter; Y92.099 - Unspecified place in other non-

institutional residence as the place of occurrence of the external cause; 

Y92.099 - Unspecified place in other non-institutional residence as the place 

of occurrence of the external cause   





(3) CAD (coronary artery disease)


SNOMED Code(s): 97871047


   ICD Code: I25.10 - ATHSCL HEART DISEASE OF NATIVE CORONARY ARTERY W/O ANG 

PCTRS   Status: Chronic   Priority: Medium   Current Visit: Yes   


Qualifiers: 


   Coronary Disease-Associated Artery/Lesion type: unspecified vessel or lesion 

type   Native vs. transplanted heart: native heart   Associated angina: without 

angina   Qualified Code(s): I25.10 - Atherosclerotic heart disease of native 

coronary artery without angina pectoris   





(4) HTN (hypertension)


SNOMED Code(s): 25597804


   ICD Code: I10 - ESSENTIAL (PRIMARY) HYPERTENSION   Status: Chronic   Priority

: Medium   Current Visit: Yes   


Qualifiers: 


   Hypertension type: essential hypertension   Qualified Code(s): I10 - 

Essential (primary) hypertension   





(5) HLD (hyperlipidemia)


SNOMED Code(s): 93909652


   ICD Code: E78.5 - HYPERLIPIDEMIA, UNSPECIFIED   Status: Chronic   Priority: 

Low   Current Visit: Yes   


Qualifiers: 


   Hyperlipidemia type: unspecified   Qualified Code(s): E78.5 - Hyperlipidemia

, unspecified   





- Patient Summary/Data


Operative Procedure(s) Performed: cephalomedullary nailing of right 

intertrochanteric hip fracture


Complications: None


Consults: 


 Consultations





11/13/17 21:33


Consult to Case Management [CONS] Routine 


Consult to Physician [CONS] Routine 


Consult to  [CONS] Routine 


Consult to Spiritual Care [CONS] Routine 


OT Evaluation and Treatment [CONS] Routine 


PT Evaluation and Treatment [CONS] Routine 


Respiratory Care Assess and Treatment [CONS] Routine 





11/14/17 07:13


Consult to Dietician [CONS] Routine 











Labs Pending at D/C: 





None


Recommended Follow-up Testing/Procedures: 





Follow up with Dr. Wallis within one week of discharge


Follow up with Orthopedics as scheduled


Continue PT/OT at [facility]


Planned Operative Procedure(s) after DC: None


Hospital Course: 





As above





- Patient Instructions


Diet: Heart Healthy Diet, Diabetic Diet


Activity: Apply Ice, As Tolerated, Elevate Extremity, Full Weight Bearing


Driving: Do Not Drive


Showering/Bathing: May Shower


Wound/Incision Care: Keep Operative Site/Wound Site Clean and Dry, Do NOT 

Change Dressing


Notify Provider of: Fever, Increased Pain, Swelling and Redness, Drainage, 

Nausea and/or Vomiting


Other/Special Instructions: Please get up and moving around every hour while 

awake.  This helps to prevent blood clots.  Please take 325mg aspirin twice 

daily - this also helps to prevent blood clots.  The medication is being used 

for blood clot prevention and not for pain control, so please use the 

medication twice daily as directed.  Please wear the DARA hose during the day 

and you may remove them at night.  Please use the pain medication as needed.  

The medication may cause drowsiness and/or constipation.  You could use a stool 

softener like docusate sodium or Colace 100mg twice daily and/or a laxative 

like Miralax daily for constipation.  Contact your primary care provider for 

further instructions if you are constipated.  Use the incentive spirometer 

often.  Please place ice to the hip often.  Please elevate the limb to decrease 

swelling.  Keep the Mepilex dressing in place until follow-up.  Please call [phone] with questions or concerns.





- Discharge Plan


Prescriptions/Med Rec: 


Acetaminophen/HYDROcodone [Norco 325-5 MG] 1 - 2 tab PO Q4H PRN #60 tablet


 PRN Reason: Pain


Aspirin [Ecotrin] 325 mg PO BID #70 tab.ec


Cyclobenzaprine [Flexeril] 10 mg PO Q8H PRN #40 tablet


 PRN Reason: muscle spasms


Docusate Sodium [Colace] 100 mg PO BID PRN #60 cap


 PRN Reason: Constipation


Docusate Sodium/Sennosides [Senna Plus] 1 tab PO BID PRN #60 tablet


 PRN Reason: constiation


Home Medications: 


 Home Meds





Acetaminophen/Diphenhydramine [Tylenol Pm Ex-Strength Caplet] 1 tab PO BEDTIME 

PRN 09/09/16 [History]


Fluticasone/Salmeterol [Advair 250-50 Diskus] 1 puff INH BID 09/09/16 [History]


Insulin Detemir [Levemir Flextouch] 6 units SQ BEDTIME 09/09/16 [History]


Insulin Detemir [Levemir Flextouch] 26 units SQ QAM 09/09/16 [History]


Metoprolol Tartrate 25 mg PO BID 09/09/16 [History]


Multivitamin [Multivitamins] 1 each PO DAILY 09/09/16 [History]


Olmesartan [Benicar] 5 mg PO DAILY 09/09/16 [History]


Silver Sulfadiazine [Silvadene 1% Cream 20 GM] 1 applic TOP TID PRN 09/09/16 [

History]


Triamcinolone Acetonide [Kenalog-10] 1 applic TOP BID PRN 09/09/16 [History]


Ubidecarenone [Coq-10] 1 tab PO DAILY 09/09/16 [History]


atorvaSTATin [Lipitor] 40 mg PO BEDTIME 09/09/16 [History]


Levothyroxine 150 mcg PO DAILY 08/22/17 [History]


Melatonin 10 mg PO BEDTIME 08/22/17 [History]


Acetaminophen/HYDROcodone [Norco 325-5 MG] 1 - 2 tab PO Q4H PRN #60 tablet 11/16 /17 [Rx]


Aspirin [Ecotrin] 325 mg PO BID #70 tab.ec 11/16/17 [Rx]


Cyclobenzaprine [Flexeril] 10 mg PO Q8H PRN #40 tablet 11/16/17 [Rx]


Docusate Sodium [Colace] 100 mg PO BID PRN #60 cap 11/16/17 [Rx]


Docusate Sodium/Sennosides [Senna Plus] 1 tab PO BID PRN #60 tablet 11/16/17 [Rx

]








Patient Handouts:  Coronary Artery Disease, Male, Open Reduction and Internal 

Fixation for Hip Fracture, Care After, Chronic Obstructive Pulmonary Disease, 

Easy-to-Read, Hypertension, Easy-to-Read, Managing Your High Blood Pressure


Referrals: 


Rody Wallis MD [Primary Care Provider] - 


Patricia Bey PA-C [Physician Assistant] - 





- Discharge Summary/Plan Comment


DC Time >30 min.: Yes (40 min)





- General Info


Date of Service: 11/16/17


Admission Dx/Problem (Free Text: 


 Admission Diagnosis/Problem





Admission Diagnosis/Problem      Intertrochanteric fracture, hip





POD #3 ORIF rt hip fx. 


VSS.


Working with therapies; moving a little better this morning


Pain under better control


Labs stable





Functional Status: Reports: Pain Controlled, Tolerating Diet, Ambulating (with 

assist), Urinating, Incentive Spirometry.  Denies: New Symptoms





- Review of Systems


General: Reports: No Symptoms


HEENT: Reports: No Symptoms


Pulmonary: Reports: No Symptoms


Cardiovascular: Reports: No Symptoms


Gastrointestinal: Reports: No Symptoms


Genitourinary: Reports: No Symptoms


Musculoskeletal: Reports: Leg Pain


Skin: Reports: No Symptoms


Neurological: Reports: No Symptoms


Psychiatric: Reports: No Symptoms





- Patient Data


Vitals - Most Recent: 


 Last Vital Signs











Temp  99.5 F   11/16/17 11:22


 


Pulse  73   11/16/17 11:22


 


Resp  18   11/16/17 11:22


 


BP  142/106 H  11/16/17 11:22


 


Pulse Ox  94 L  11/16/17 11:22











Weight - Most Recent: 225 lb 4.8 oz


I&O - Last 24 hours: 


 Intake & Output











 11/15/17 11/16/17 11/16/17





 22:59 06:59 14:59


 


Intake Total 500 800 0


 


Output Total 650 875 


 


Balance -150 -75 0











Lab Results - Last 24 hrs: 


 Laboratory Results - last 24 hr











  11/15/17 11/15/17 11/16/17 Range/Units





  17:08 20:58 06:19 


 


WBC    5.45  (4.23-9.07)  K/mm3


 


RBC    3.62 L  (4.63-6.08)  M/mm3


 


Hgb    11.9 L  (13.7-17.5)  gm/L


 


Hct    35.1 L  (40.1-51.0)  %


 


MCV    97.0 H  (79.0-92.2)  fl


 


MCH    32.9 H  (25.7-32.2)  pg


 


MCHC    33.9  (32.2-35.5)  g/dl


 


RDW Std Deviation    41.0  (35.1-43.9)  fL


 


Plt Count    97 L  (163-337)  K/mm3


 


MPV    10.8  (9.4-12.3)  fl


 


Neut % (Auto)    55.7  (34.0-67.9)  %


 


Lymph % (Auto)    19.3 L  (21.8-53.1)  %


 


Mono % (Auto)    13.6 H  (5.3-12.2)  %


 


Eos % (Auto)    10.8 H  (0.8-7.0)  


 


Baso % (Auto)    0.4  (0.1-1.2)  %


 


Neut # (Auto)    3.04  (1.78-5.38)  K/mm3


 


Lymph # (Auto)    1.05 L  (1.32-3.57)  K/mm3


 


Mono # (Auto)    0.74  (0.30-0.82)  K/mm3


 


Eos # (Auto)    0.59 H  (0.04-0.54)  K/mm3


 


Baso # (Auto)    0.02  (0.01-0.08)  K/mm3


 


Manual Slide Review    Abnormal smear  


 


Sodium     (136-145)  mEq/L


 


Potassium     (3.5-5.1)  mEq/L


 


Chloride     ()  mEq/L


 


Carbon Dioxide     (21-32)  mEq/L


 


Anion Gap     (5-15)  


 


BUN     (7-18)  mg/dL


 


Creatinine     (0.7-1.3)  mg/dL


 


Est Cr Clr Drug Dosing     mL/min


 


Estimated GFR (MDRD)     (>60)  mL/min


 


BUN/Creatinine Ratio     (14-18)  


 


Glucose     ()  mg/dL


 


POC Glucose  188 H  129 H   ()  mg/dL


 


Calcium     (8.5-10.1)  mg/dL


 


Magnesium     (1.8-2.4)  mg/dl














  11/16/17 11/16/17 11/16/17 Range/Units





  06:19 06:39 07:19 


 


WBC     (4.23-9.07)  K/mm3


 


RBC     (4.63-6.08)  M/mm3


 


Hgb     (13.7-17.5)  gm/L


 


Hct     (40.1-51.0)  %


 


MCV     (79.0-92.2)  fl


 


MCH     (25.7-32.2)  pg


 


MCHC     (32.2-35.5)  g/dl


 


RDW Std Deviation     (35.1-43.9)  fL


 


Plt Count     (163-337)  K/mm3


 


MPV     (9.4-12.3)  fl


 


Neut % (Auto)     (34.0-67.9)  %


 


Lymph % (Auto)     (21.8-53.1)  %


 


Mono % (Auto)     (5.3-12.2)  %


 


Eos % (Auto)     (0.8-7.0)  


 


Baso % (Auto)     (0.1-1.2)  %


 


Neut # (Auto)     (1.78-5.38)  K/mm3


 


Lymph # (Auto)     (1.32-3.57)  K/mm3


 


Mono # (Auto)     (0.30-0.82)  K/mm3


 


Eos # (Auto)     (0.04-0.54)  K/mm3


 


Baso # (Auto)     (0.01-0.08)  K/mm3


 


Manual Slide Review     


 


Sodium  141    (136-145)  mEq/L


 


Potassium  4.1    (3.5-5.1)  mEq/L


 


Chloride  105    ()  mEq/L


 


Carbon Dioxide  31    (21-32)  mEq/L


 


Anion Gap  9.1    (5-15)  


 


BUN  17    (7-18)  mg/dL


 


Creatinine  1.1    (0.7-1.3)  mg/dL


 


Est Cr Clr Drug Dosing  66.55    mL/min


 


Estimated GFR (MDRD)  > 60    (>60)  mL/min


 


BUN/Creatinine Ratio  15.5    (14-18)  


 


Glucose  77 L    ()  mg/dL


 


POC Glucose   69 L  147 H  ()  mg/dL


 


Calcium  8.0 L    (8.5-10.1)  mg/dL


 


Magnesium  1.8    (1.8-2.4)  mg/dl














  11/16/17 Range/Units





  12:01 


 


WBC   (4.23-9.07)  K/mm3


 


RBC   (4.63-6.08)  M/mm3


 


Hgb   (13.7-17.5)  gm/L


 


Hct   (40.1-51.0)  %


 


MCV   (79.0-92.2)  fl


 


MCH   (25.7-32.2)  pg


 


MCHC   (32.2-35.5)  g/dl


 


RDW Std Deviation   (35.1-43.9)  fL


 


Plt Count   (163-337)  K/mm3


 


MPV   (9.4-12.3)  fl


 


Neut % (Auto)   (34.0-67.9)  %


 


Lymph % (Auto)   (21.8-53.1)  %


 


Mono % (Auto)   (5.3-12.2)  %


 


Eos % (Auto)   (0.8-7.0)  


 


Baso % (Auto)   (0.1-1.2)  %


 


Neut # (Auto)   (1.78-5.38)  K/mm3


 


Lymph # (Auto)   (1.32-3.57)  K/mm3


 


Mono # (Auto)   (0.30-0.82)  K/mm3


 


Eos # (Auto)   (0.04-0.54)  K/mm3


 


Baso # (Auto)   (0.01-0.08)  K/mm3


 


Manual Slide Review   


 


Sodium   (136-145)  mEq/L


 


Potassium   (3.5-5.1)  mEq/L


 


Chloride   ()  mEq/L


 


Carbon Dioxide   (21-32)  mEq/L


 


Anion Gap   (5-15)  


 


BUN   (7-18)  mg/dL


 


Creatinine   (0.7-1.3)  mg/dL


 


Est Cr Clr Drug Dosing   mL/min


 


Estimated GFR (MDRD)   (>60)  mL/min


 


BUN/Creatinine Ratio   (14-18)  


 


Glucose   ()  mg/dL


 


POC Glucose  166 H  ()  mg/dL


 


Calcium   (8.5-10.1)  mg/dL


 


Magnesium   (1.8-2.4)  mg/dl











Med Orders - Current: 


 Current Medications





Acetaminophen (Tylenol)  650 mg PO Q4H PRN


   PRN Reason: Pain (Mild 1-3)/fever


Hydrocodone Bitart/Acetaminophen (Norco 325-5 Mg)  1 - 2 tab PO Q4H PRN


   PRN Reason: Pain


   Last Admin: 11/16/17 11:04 Dose:  2 tab


Albuterol/Ipratropium (Duoneb 3.0-0.5 Mg/3 Ml)  3 ml NEB Q4H PRN


   PRN Reason: Shortness Of Breath/wheezing


Aspirin (Ecotrin)  325 mg PO BID ODALIS


   Last Admin: 11/16/17 09:02 Dose:  325 mg


Bisacodyl (Dulcolax)  5 mg PO DAILY PRN


   PRN Reason: Constipation


Cyclobenzaprine HCl (Flexeril)  10 mg PO Q8H PRN


   PRN Reason: Muscle Spasm


   Last Admin: 11/16/17 07:33 Dose:  10 mg


Dextrose/Water (Dextrose 50% In Water)  50 ml IVPUSH ASDIRECTED PRN


   PRN Reason: Hypoglycemia


Docusate Sodium (Colace)  100 mg PO BID PRN


   PRN Reason: Constipation


   Last Admin: 11/16/17 05:32 Dose:  100 mg


Hydromorphone HCl (Dilaudid)  1 mg IVPUSH Q4H PRN


   PRN Reason: Pain (severe 7-10)


   Last Admin: 11/15/17 14:20 Dose:  1 mg


Insulin Aspart (Novolog)  0 unit SUBCUT QIDACANDBED [facility]


   PRN Reason: Protocol


   Last Admin: 11/16/17 12:05 Dose:  1 unit


Insulin Detemir (Levemir)  6 unit SUBCUT BEDTIME [facility]


   Last Admin: 11/15/17 21:38 Dose:  6 units


Insulin Detemir (Levemir)  26 unit SUBCUT QAM [facility]


   Last Admin: 11/16/17 09:03 Dose:  15 units


Levothyroxine Sodium (Levothyroxine)  150 mcg PO DAILY@0600 [facility]


   Last Admin: 11/16/17 05:31 Dose:  150 mcg


Lorazepam (Ativan)  0.5 mg IV Q6H PRN


   PRN Reason: Anxiety


Losartan Potassium (Cozaar)  25 mg PO DAILY [facility]


   Last Admin: 11/16/17 08:59 Dose:  25 mg


Metoprolol Tartrate (Lopressor)  25 mg PO BID [facility]


   Last Admin: 11/16/17 09:01 Dose:  25 mg


Multivitamins (Thera)  1 each PO DAILY [facility]


   Last Admin: 11/16/17 09:01 Dose:  1 each


Ondansetron HCl (Zofran)  4 mg IV Q6H PRN


   PRN Reason: Nausea/Vomiting


Ondansetron HCl (Zofran Odt)  4 mg PO Q4H PRN


   PRN Reason: Nausea/Vomiting


Fluticasone/Salmeterol [Advair 250-50]  0 each INH BID [facility]


   Last Admin: 11/16/17 09:32 Dose:  1 each


Polyethylene Glycol (Miralax)  17 gm PO DAILY PRN


   PRN Reason: Constipation


Rosuvastatin Calcium (Crestor)  10 mg PO BEDTIME [facility]


   Last Admin: 11/15/17 21:31 Dose:  10 mg


Senna/Docusate Sodium (Senna Plus)  1 tab PO BID PRN


   PRN Reason: Constipation


   Last Admin: 11/16/17 08:59 Dose:  1 tab


Silver Sulfadiazine (Silvadene 1% Cream 50 Gm)  0 gm TOP TID PRN


   PRN Reason: SKIN COMPLICATIONS


Temazepam (Restoril)  15 mg PO BEDTIME PRN


   PRN Reason: Sleep





Discontinued Medications





Hydrocodone Bitart/Acetaminophen (Norco 325-5 Mg)  1 tab PO Q4H PRN


   PRN Reason: Pain (moderate 4-6)


   Last Admin: 11/14/17 14:47 Dose:  1 tab


Aspirin (Halfprin)  162 mg PO DAILY ODALIS


Aspirin (Halfprin)  162 mg PO ONETIME ONE


   Stop: 11/15/17 11:46


   Last Admin: 11/15/17 11:38 Dose:  162 mg


Bupivacaine HCl (Marcaine 0.25%) Confirm Administered Dose 30 ml .ROUTE .STK-

MED ONE


   Stop: 11/14/17 10:25


   Last Admin: 11/14/17 12:22 Dose:  20 ml


Bupivacaine HCl (Marcaine 0.25%) Confirm Administered Dose 30 ml .ROUTE .STK-

MED ONE


   Stop: 11/14/17 11:24


Cefazolin Sodium (Ancef) Confirm Administered Dose 2 gm .ROUTE .STK-MED ONE


   Stop: 11/14/17 10:44


Ephedrine Sulfate (Ephedrine Sulfate) Confirm Administered Dose 50 mg .ROUTE 

.STK-MED ONE


   Stop: 11/14/17 11:59


Fentanyl (Sublimaze) Confirm Administered Dose 100 mcg .ROUTE .STK-MED ONE


   Stop: 11/14/17 10:44


Fentanyl (Sublimaze)  50 mcg IVPUSH Q5M PRN


   PRN Reason: PAIN


   Stop: 11/14/17 16:00


Hydromorphone HCl (Dilaudid)  0.5 mg IVPUSH NOW STA


   Stop: 11/13/17 17:59


   Last Admin: 11/13/17 18:01 Dose:  0.5 mg


Hydromorphone HCl (Dilaudid)  1 mg IVPUSH ONETIME ONE


   Stop: 11/13/17 18:32


   Last Admin: 11/13/17 18:36 Dose:  1 mg


Sodium Chloride (Normal Saline)  1,000 mls @ 150 mls/hr IV ASDIRECTED ODALIS


   Last Admin: 11/14/17 09:11 Dose:  150 mls/hr


Promethazine HCl 12.5 mg/ (Sodium Chloride)  50.5 mls @ 100 mls/hr IV Q6H PRN


   PRN Reason: Nausea/Vomiting


Lidocaine HCl (Xylocaine-Mpf 1%) Confirm Administered Dose 4 mls @ as directed 

.ROUTE .STK-MED ONE


   Stop: 11/14/17 10:44


Magnesium Sulfate 2 gm/ Premix  50 mls @ 25 mls/hr IV ONETIME ONE


   Stop: 11/14/17 18:59


   Last Admin: 11/14/17 16:48 Dose:  Not Given


Lactated Ringer's (Ringers, Lactated) Confirm Administered Dose 1,000 mls @ as 

directed .ROUTE .STK-MED ONE


   Stop: 11/14/17 12:00


Lactated Ringer's (Ringers, Lactated)  500 mls @ 1,000 mls/hr IV .BOLUS ONE


   Stop: 11/14/17 13:14


   Last Infusion: 11/14/17 13:02 Dose:  Infused


Magnesium Oxide (Magnesium Oxide)  800 mg PO ONETIME ONE


   Stop: 11/14/17 09:01


   Last Admin: 11/14/17 09:17 Dose:  Not Given


Midazolam HCl (Versed 1 Mg/Ml) Confirm Administered Dose 2 mg .ROUTE .STK-MED 

ONE


   Stop: 11/14/17 10:44


Non-Formulary Medication (Acetaminophen/Diphenhydramine)  1 tab PO BEDTIME PRN


   PRN Reason: Insomnia


Non-Formulary Medication (Melatonin [Melatonin])  10 mg PO BEDTIME ODAL[facility]


Non-Formulary Medication (Triamcinolone Acetonide [Kenalog-10])  1 applic TOP 

BID PRN


   PRN Reason: skin complications


Non-Formulary Medication (Ubidecarenone)  1 tab PO DAILY [facility]


Ondansetron HCl (Zofran) Confirm Administered Dose 4 mg .ROUTE .STK-MED ONE


   Stop: 11/14/17 10:44


Phenylephrine HCl (Shai-Synephrine) Confirm Administered Dose 10 mg .ROUTE .STK-

MED ONE


   Stop: 11/14/17 11:33


Propofol (Diprivan  20 Ml) Confirm Administered Dose 200 mg .ROUTE .STK-MED ONE


   Stop: 11/14/17 10:44


Propofol (Diprivan  20 Ml) Confirm Administered Dose 200 mg .ROUTE .STK-MED ONE


   Stop: 11/14/17 12:03


Propofol (Diprivan  20 Ml) Confirm Administered Dose 200 mg .ROUTE .STK-MED ONE


   Stop: 11/14/17 12:26


Rosuvastatin Calcium (Crestor)  10 mg PO DAILY ODAL[facility]


Silver Sulfadiazine (Silvadene 1% Cream 400 Gm)  0 gm TOP TID PRN


   PRN Reason: SKIN COMPLICATIONS











- Exam


Quality Assessment: Reports: DVT Prophylaxis


General: Reports: Alert, Oriented, Cooperative, No Acute Distress


HEENT: Reports: Pupils Equal, Pupils Reactive, Mucous Membr. Moist/Pink


Neck: Reports: Supple


Lungs: Reports: Clear to Auscultation, Normal Respiratory Effort


Cardiovascular: Reports: Regular Rate, Regular Rhythm


GI/Abdominal Exam: Normal Bowel Sounds, Soft, Non-Tender


 (Male) Exam: Deferred


Rectal (Males) Exam: Deferred


Extremities: Other (Teds and SCD's; right hip/thigh is soft- dressing dry/

intact. CMS + and = bilat to LE)


Neurological: Reports: No New Focal Deficit


Psy/Mental Status: Reports: Alert, Normal Affect, Normal Mood





*Q Meaningful Use (DIS)





- VTE *Q


VTE Criteria *Q: 








- Stroke *Q


Stroke Criteria *Q: 








- AMI *Q


AMI Criteria *Q:

## 2017-11-17 NOTE — [id number]
- General Info


Date of Service: 11/16/17


POD#: 2


Functional Status: Reports: Pain Controlled, Other (The pt will be transfered 

to [facility] today for continued therapy.)





- Patient Data


Vitals - Most Recent: 


 Last Vital Signs











Temp  99.5 F   11/16/17 11:22


 


Pulse  73   11/16/17 11:22


 


Resp  18   11/16/17 11:22


 


BP  115/61   11/16/17 13:54


 


Pulse Ox  94 L  11/16/17 11:22











Weight - Most Recent: 225 lb 4.8 oz


Lab Results Last 24 Hrs: 


 Laboratory Results - last 24 hr











  11/16/17 Range/Units





  12:01 


 


POC Glucose  166 H  ()  mg/dL











Med Orders - Current: 


 Current Medications








Discontinued Medications





Acetaminophen (Tylenol)  650 mg PO Q4H PRN


   PRN Reason: Pain (Mild 1-3)/fever


Hydrocodone Bitart/Acetaminophen (Norco 325-5 Mg)  1 tab PO Q4H PRN


   PRN Reason: Pain (moderate 4-6)


   Last Admin: 11/14/17 14:47 Dose:  1 tab


Hydrocodone Bitart/Acetaminophen (Norco 325-5 Mg)  1 - 2 tab PO Q4H PRN


   PRN Reason: Pain


   Last Admin: 11/16/17 11:04 Dose:  2 tab


Albuterol/Ipratropium (Duoneb 3.0-0.5 Mg/3 Ml)  3 ml NEB Q4H PRN


   PRN Reason: Shortness Of Breath/wheezing


Aspirin (Halfprin)  162 mg PO DAILY [facility]


Aspirin (Ecotrin)  325 mg PO BID [facility]


   Last Admin: 11/16/17 09:02 Dose:  325 mg


Aspirin (Halfprin)  162 mg PO ONETIME ONE


   Stop: 11/15/17 11:46


   Last Admin: 11/15/17 11:38 Dose:  162 mg


Bisacodyl (Dulcolax)  5 mg PO DAILY PRN


   PRN Reason: Constipation


Bupivacaine HCl (Marcaine 0.25%) Confirm Administered Dose 30 ml .ROUTE .STK-

MED ONE


   Stop: 11/14/17 10:25


   Last Admin: 11/14/17 12:22 Dose:  20 ml


Bupivacaine HCl (Marcaine 0.25%) Confirm Administered Dose 30 ml .ROUTE .STK-

MED ONE


   Stop: 11/14/17 11:24


Cefazolin Sodium (Ancef) Confirm Administered Dose 2 gm .ROUTE .STK-MED ONE


   Stop: 11/14/17 10:44


Cyclobenzaprine HCl (Flexeril)  10 mg PO Q8H PRN


   PRN Reason: Muscle Spasm


   Last Admin: 11/16/17 07:33 Dose:  10 mg


Dextrose/Water (Dextrose 50% In Water)  50 ml IVPUSH ASDIRECTED PRN


   PRN Reason: Hypoglycemia


Docusate Sodium (Colace)  100 mg PO BID PRN


   PRN Reason: Constipation


   Last Admin: 11/16/17 05:32 Dose:  100 mg


Ephedrine Sulfate (Ephedrine Sulfate) Confirm Administered Dose 50 mg .ROUTE 

.STK-MED ONE


   Stop: 11/14/17 11:59


Fentanyl (Sublimaze) Confirm Administered Dose 100 mcg .ROUTE .STK-MED ONE


   Stop: 11/14/17 10:44


Fentanyl (Sublimaze)  50 mcg IVPUSH Q5M PRN


   PRN Reason: PAIN


   Stop: 11/14/17 16:00


Hydromorphone HCl (Dilaudid)  0.5 mg IVPUSH NOW STA


   Stop: 11/13/17 17:59


   Last Admin: 11/13/17 18:01 Dose:  0.5 mg


Hydromorphone HCl (Dilaudid)  1 mg IVPUSH ONETIME ONE


   Stop: 11/13/17 18:32


   Last Admin: 11/13/17 18:36 Dose:  1 mg


Hydromorphone HCl (Dilaudid)  1 mg IVPUSH Q4H PRN


   PRN Reason: Pain (severe 7-10)


   Last Admin: 11/15/17 14:20 Dose:  1 mg


Sodium Chloride (Normal Saline)  1,000 mls @ 150 mls/hr IV ASDIRECTED ODALIS


   Last Admin: 11/14/17 09:11 Dose:  150 mls/hr


Promethazine HCl 12.5 mg/ (Sodium Chloride)  50.5 mls @ 100 mls/hr IV Q6H PRN


   PRN Reason: Nausea/Vomiting


Lidocaine HCl (Xylocaine-Mpf 1%) Confirm Administered Dose 4 mls @ as directed 

.ROUTE .STK-MED ONE


   Stop: 11/14/17 10:44


Magnesium Sulfate 2 gm/ Premix  50 mls @ 25 mls/hr IV ONETIME ONE


   Stop: 11/14/17 18:59


   Last Admin: 11/14/17 16:48 Dose:  Not Given


Lactated Ringer's (Ringers, Lactated) Confirm Administered Dose 1,000 mls @ as 

directed .ROUTE .STK-MED ONE


   Stop: 11/14/17 12:00


Lactated Ringer's (Ringers, Lactated)  500 mls @ 1,000 mls/hr IV .BOLUS ONE


   Stop: 11/14/17 13:14


   Last Infusion: 11/14/17 13:02 Dose:  Infused


Insulin Aspart (Novolog)  0 unit SUBCUT QIDACANDBED [facility]


   PRN Reason: Protocol


   Last Admin: 11/16/17 12:05 Dose:  1 unit


Insulin Detemir (Levemir)  6 unit SUBCUT BEDTIME [facility]


   Last Admin: 11/15/17 21:38 Dose:  6 units


Insulin Detemir (Levemir)  26 unit SUBCUT QAM [facility]


   Last Admin: 11/16/17 09:03 Dose:  15 units


Levothyroxine Sodium (Levothyroxine)  150 mcg PO DAILY@0600 [facility]


   Last Admin: 11/16/17 05:31 Dose:  150 mcg


Lorazepam (Ativan)  0.5 mg IV Q6H PRN


   PRN Reason: Anxiety


Losartan Potassium (Cozaar)  25 mg PO DAILY [facility]


   Last Admin: 11/16/17 08:59 Dose:  25 mg


Magnesium Oxide (Magnesium Oxide)  800 mg PO ONETIME ONE


   Stop: 11/14/17 09:01


   Last Admin: 11/14/17 09:17 Dose:  Not Given


Metoprolol Tartrate (Lopressor)  25 mg PO BID [facility]


   Last Admin: 11/16/17 09:01 Dose:  25 mg


Midazolam HCl (Versed 1 Mg/Ml) Confirm Administered Dose 2 mg .ROUTE .STK-MED 

ONE


   Stop: 11/14/17 10:44


Multivitamins (Thera)  1 each PO DAILY [facility]


   Last Admin: 11/16/17 09:01 Dose:  1 each


Non-Formulary Medication (Acetaminophen/Diphenhydramine)  1 tab PO BEDTIME PRN


   PRN Reason: Insomnia


Non-Formulary Medication (Melatonin [Melatonin])  10 mg PO BEDTIME [facility]


Non-Formulary Medication (Triamcinolone Acetonide [Kenalog-10])  1 applic TOP 

BID PRN


   PRN Reason: skin complications


Non-Formulary Medication (Ubidecarenone)  1 tab PO DAILY [facility]


Ondansetron HCl (Zofran)  4 mg IV Q6H PRN


   PRN Reason: Nausea/Vomiting


Ondansetron HCl (Zofran Odt)  4 mg PO Q4H PRN


   PRN Reason: Nausea/Vomiting


Ondansetron HCl (Zofran) Confirm Administered Dose 4 mg .ROUTE .STK-MED ONE


   Stop: 11/14/17 10:44


Fluticasone/Salmeterol [Advair 250-50]  0 each INH BID [facility]


   Last Admin: 11/16/17 09:32 Dose:  1 each


Phenylephrine HCl (Shai-Synephrine) Confirm Administered Dose 10 mg .ROUTE .STK-

MED ONE


   Stop: 11/14/17 11:33


Polyethylene Glycol (Miralax)  17 gm PO DAILY PRN


   PRN Reason: Constipation


Propofol (Diprivan  20 Ml) Confirm Administered Dose 200 mg .ROUTE .STK-MED ONE


   Stop: 11/14/17 10:44


Propofol (Diprivan  20 Ml) Confirm Administered Dose 200 mg .ROUTE .STK-MED ONE


   Stop: 11/14/17 12:03


Propofol (Diprivan  20 Ml) Confirm Administered Dose 200 mg .ROUTE .STK-MED ONE


   Stop: 11/14/17 12:26


Rosuvastatin Calcium (Crestor)  10 mg PO DAILY [facility]


Rosuvastatin Calcium (Crestor)  10 mg PO BEDTIME [facility]


   Last Admin: 11/15/17 21:31 Dose:  10 mg


Senna/Docusate Sodium (Senna Plus)  1 tab PO BID PRN


   PRN Reason: Constipation


   Last Admin: 11/16/17 08:59 Dose:  1 tab


Silver Sulfadiazine (Silvadene 1% Cream 400 Gm)  0 gm TOP TID PRN


   PRN Reason: SKIN COMPLICATIONS


Silver Sulfadiazine (Silvadene 1% Cream 50 Gm)  0 gm TOP TID PRN


   PRN Reason: SKIN COMPLICATIONS


Temazepam (Restoril)  15 mg PO BEDTIME PRN


   PRN Reason: Sleep











- Exam


Wound/Incisions: Other (No change with Mepilex from previous evaluations.)


Extremities: Other (NVS intact for RLE.  Melvin's negative.)





- Problem List Review


Problem List Initiated/Reviewed/Updated: Yes





- My Orders


Last 24 Hours: 


 Active Orders 24 hr











 Category Date Time Status


 


 Ready for Discharge [RC] PER UNIT ROUTINE Care  11/16/17 13:01 Active














- Assessment


Assessment           (Free Text/Narrative):: 





POD#2 - IM dee right hip





- Plan


Plan                        (Free Text/Narrative):: 





1.  Discharge to NH today for continued therapy.


2.  ASA BID.


3.  Further orders per Hospitalist service.





The pt was evaluated by Dr. Berry today.

## 2022-07-10 ENCOUNTER — HOSPITAL ENCOUNTER (EMERGENCY)
Dept: HOSPITAL 41 - JD.ED | Age: 77
LOS: 1 days | Discharge: HOME | End: 2022-07-11
Payer: MEDICARE

## 2022-07-10 VITALS — SYSTOLIC BLOOD PRESSURE: 134 MMHG | HEART RATE: 77 BPM | DIASTOLIC BLOOD PRESSURE: 56 MMHG

## 2022-07-10 DIAGNOSIS — Z87.891: ICD-10-CM

## 2022-07-10 DIAGNOSIS — R74.8: ICD-10-CM

## 2022-07-10 DIAGNOSIS — I25.10: ICD-10-CM

## 2022-07-10 DIAGNOSIS — E11.9: ICD-10-CM

## 2022-07-10 DIAGNOSIS — N28.9: ICD-10-CM

## 2022-07-10 DIAGNOSIS — E78.00: ICD-10-CM

## 2022-07-10 DIAGNOSIS — R94.5: ICD-10-CM

## 2022-07-10 DIAGNOSIS — I10: ICD-10-CM

## 2022-07-10 DIAGNOSIS — Z79.82: ICD-10-CM

## 2022-07-10 DIAGNOSIS — R10.31: Primary | ICD-10-CM

## 2022-07-10 DIAGNOSIS — I25.2: ICD-10-CM

## 2022-07-10 DIAGNOSIS — N32.9: ICD-10-CM

## 2022-07-10 DIAGNOSIS — Z86.16: ICD-10-CM

## 2022-07-10 DIAGNOSIS — Z79.899: ICD-10-CM

## 2022-07-10 DIAGNOSIS — Z79.4: ICD-10-CM

## 2022-07-10 DIAGNOSIS — Z86.73: ICD-10-CM

## 2022-07-10 DIAGNOSIS — Z88.1: ICD-10-CM

## 2022-07-10 DIAGNOSIS — E03.9: ICD-10-CM

## 2022-07-10 PROCEDURE — 96375 TX/PRO/DX INJ NEW DRUG ADDON: CPT

## 2022-07-10 PROCEDURE — 85007 BL SMEAR W/DIFF WBC COUNT: CPT

## 2022-07-10 PROCEDURE — 74177 CT ABD & PELVIS W/CONTRAST: CPT

## 2022-07-10 PROCEDURE — 83690 ASSAY OF LIPASE: CPT

## 2022-07-10 PROCEDURE — 80053 COMPREHEN METABOLIC PANEL: CPT

## 2022-07-10 PROCEDURE — 81001 URINALYSIS AUTO W/SCOPE: CPT

## 2022-07-10 PROCEDURE — 96374 THER/PROPH/DIAG INJ IV PUSH: CPT

## 2022-07-10 PROCEDURE — 36415 COLL VENOUS BLD VENIPUNCTURE: CPT

## 2022-07-10 PROCEDURE — 83735 ASSAY OF MAGNESIUM: CPT

## 2022-07-10 PROCEDURE — 99284 EMERGENCY DEPT VISIT MOD MDM: CPT

## 2022-07-10 PROCEDURE — 85027 COMPLETE CBC AUTOMATED: CPT

## 2022-07-10 PROCEDURE — 96361 HYDRATE IV INFUSION ADD-ON: CPT

## 2022-07-11 LAB — EGFRCR SERPLBLD CKD-EPI 2021: 45 ML/MIN (ref 60–?)
